# Patient Record
Sex: FEMALE | Race: BLACK OR AFRICAN AMERICAN | Employment: FULL TIME | URBAN - METROPOLITAN AREA
[De-identification: names, ages, dates, MRNs, and addresses within clinical notes are randomized per-mention and may not be internally consistent; named-entity substitution may affect disease eponyms.]

---

## 2017-01-04 ENCOUNTER — DOCUMENTATION ONLY (OUTPATIENT)
Dept: INTERNAL MEDICINE CLINIC | Age: 47
End: 2017-01-04

## 2017-01-04 ENCOUNTER — CLINICAL SUPPORT (OUTPATIENT)
Dept: INTERNAL MEDICINE CLINIC | Age: 47
End: 2017-01-04

## 2017-01-04 DIAGNOSIS — Z00.00 HEALTHCARE MAINTENANCE: Primary | ICD-10-CM

## 2017-01-04 NOTE — PROGRESS NOTES
Patient received Tdap in office today. Administered Boostrix 0.5 ml in left deltoid, IM.  Pt tolerated well

## 2017-01-05 ENCOUNTER — OFFICE VISIT (OUTPATIENT)
Dept: INTERNAL MEDICINE CLINIC | Age: 47
End: 2017-01-05

## 2017-01-05 VITALS
OXYGEN SATURATION: 97 % | WEIGHT: 241 LBS | RESPIRATION RATE: 16 BRPM | HEIGHT: 63 IN | HEART RATE: 89 BPM | DIASTOLIC BLOOD PRESSURE: 80 MMHG | SYSTOLIC BLOOD PRESSURE: 114 MMHG | TEMPERATURE: 97.7 F | BODY MASS INDEX: 42.7 KG/M2

## 2017-01-05 DIAGNOSIS — M94.262 CHONDROMALACIA OF LEFT KNEE: ICD-10-CM

## 2017-01-05 DIAGNOSIS — J30.89 NON-SEASONAL ALLERGIC RHINITIS DUE TO OTHER ALLERGIC TRIGGER: ICD-10-CM

## 2017-01-05 DIAGNOSIS — Z00.00 WELL WOMAN EXAM (NO GYNECOLOGICAL EXAM): Primary | ICD-10-CM

## 2017-01-05 DIAGNOSIS — N92.4 EXCESSIVE BLEEDING IN PREMENOPAUSAL PERIOD: ICD-10-CM

## 2017-01-05 DIAGNOSIS — F41.8 DEPRESSION WITH ANXIETY: ICD-10-CM

## 2017-01-05 DIAGNOSIS — R73.09 ELEVATED HEMOGLOBIN A1C: ICD-10-CM

## 2017-01-05 DIAGNOSIS — H92.01 EAR PAIN, RIGHT: ICD-10-CM

## 2017-01-05 RX ORDER — DULOXETIN HYDROCHLORIDE 60 MG/1
60 CAPSULE, DELAYED RELEASE ORAL
Qty: 60 CAP | Refills: 1 | Status: SHIPPED | OUTPATIENT
Start: 2017-01-05 | End: 2017-02-20 | Stop reason: SDUPTHER

## 2017-01-05 NOTE — PROGRESS NOTES
HISTORY OF PRESENT ILLNESS  Andrea Farris is a 42 y.o. female. HPI   Health Maintenance   Topic Date Due    PAP AKA CERVICAL CYTOLOGY  08/11/2018    DTaP/Tdap/Td series (2 - Td) 01/04/2027    INFLUENZA AGE 9 TO ADULT  Addressed   Body mass index is 43.08 kg/(m^2). has gained weight   She has not been able to exercise due to knee pain (wa biking after knee injury) and dietary indiscretion. Depression  Patient is seen for followup of depression. She has not been treated for depression in past.Suspected mother had mood disorder. She has been to therapy in the past. She did not connect with the therapist.   +Anger & PMDD   Stressors: New NP Luis Antonio Serna, looking for a job. she denies suicidal thoughts with specific plan. she experiences the following side effects from the treatment: none. PHQ 2 / 9, over the last two weeks 1/5/2017   Little interest or pleasure in doing things Nearly every day   Feeling down, depressed or hopeless Nearly every day   Total Score PHQ 2 6   Trouble falling or staying asleep, or sleeping too much Nearly every day   Feeling tired or having little energy Nearly every day   Poor appetite or overeating Nearly every day   Feeling bad about yourself - or that you are a failure or have let yourself or your family down Several days   Trouble concentrating on things such as school, work, reading or watching TV Not at all   Moving or speaking so slowly that other people could have noticed; or the opposite being so fidgety that others notice Not at all   Thoughts of being better off dead, or hurting yourself in some way Not at all   PHQ 9 Score 16   How difficult have these problems made it for you to do your work, take care of your home and get along with others Very difficult     Birth Control  She has h/o PMDD and fibroids. She has stopped OCP due to CVA concerns. She has a gynecologist.     Knee Pain  Ongoing. Scheduled for Right knee surgery (meniscectomy) 1/18/2017.      Ear Pain  Right ear pain started yesterday. Denies URI sx, fevers, chills. Review of Systems   Constitutional: Negative for chills, fever and weight loss. HENT: Negative for congestion and sore throat. Eyes: Negative for blurred vision and double vision. Respiratory: Negative for cough and shortness of breath. Cardiovascular: Negative for chest pain, orthopnea and leg swelling. Gastrointestinal: Negative for abdominal pain, blood in stool, constipation, diarrhea, heartburn, nausea and vomiting. Genitourinary: Negative for frequency and urgency. Musculoskeletal: Negative for joint pain and myalgias. Neurological: Negative for dizziness, tremors, weakness and headaches. Endo/Heme/Allergies: Does not bruise/bleed easily. Psychiatric/Behavioral: Positive for depression. Negative for suicidal ideas. The patient is nervous/anxious (AIDEN 7 score 8). Patient Active Problem List    Diagnosis Date Noted    Chondromalacia of left knee 12/22/2015    Migraines 07/01/2015    Menorrhagia 07/01/2015    Pulmonary sarcoidosis (HCC) 07/01/2015       Current Outpatient Prescriptions   Medication Sig Dispense Refill    DULoxetine (CYMBALTA) 60 mg capsule Take 1 Cap by mouth daily (with breakfast). 60 Cap 1    traMADol (ULTRAM) 50 mg tablet Take 1 Tab by mouth every eight (8) hours as needed for Pain. Max Daily Amount: 150 mg. 45 Tab 0    azelastine (ASTELIN) 137 mcg (0.1 %) nasal spray 1 [place] by Both Nostrils route two (2) times a day. Use in each nostril as directed 1 Bottle 3    levonorgestrel-ethinyl estradiol (ALTAVERA, 28,) 0.15-0.03 mg per tablet Take  by mouth daily.          Allergies   Allergen Reactions    Bactrim [Sulfamethoprim Ds] Hives and Other (comments)     flushing      Visit Vitals    /80 (BP 1 Location: Right arm, BP Patient Position: Sitting)    Pulse 89    Temp 97.7 °F (36.5 °C) (Oral)    Resp 16    Ht 5' 2.72\" (1.593 m)    Wt 241 lb (109.3 kg)    SpO2 97%    BMI 43.08 kg/m2       Physical Exam   Constitutional: She is oriented to person, place, and time. She appears well-developed and well-nourished. HENT:   Right Ear: External ear normal.   Left Ear: External ear normal.   Mouth/Throat: Oropharynx is clear and moist. No oropharyngeal exudate. Eyes: Conjunctivae are normal. No scleral icterus. Neck: Normal range of motion. Neck supple. No thyromegaly present. Cardiovascular: Normal rate, regular rhythm and normal heart sounds. Exam reveals no gallop and no friction rub. No murmur heard. Pulmonary/Chest: Effort normal and breath sounds normal. No respiratory distress. She has no wheezes. She has no rales. She exhibits no tenderness. Abdominal: Soft. Bowel sounds are normal. She exhibits no distension. There is no tenderness. There is no rebound and no guarding. Genitourinary: Rectal exam shows guaiac negative stool. Genitourinary Comments: Deferred for gyn per pt request   Musculoskeletal: Normal range of motion. She exhibits no edema or tenderness. Neurological: She is alert and oriented to person, place, and time. Psychiatric:   Tearful       ASSESSMENT and PLAN  Ashly Fischer was seen today for complete physical.    Diagnoses and all orders for this visit:    Well woman exam (no gynecological exam)- Health Maintenance reviewed   -     CBC WITH AUTOMATED DIFF  -     LIPID PANEL  -     METABOLIC PANEL, COMPREHENSIVE  -     HEMOGLOBIN A1C WITH EAG  -     TSH 3RD GENERATION  -     THYROID PANEL  -     VITAMIN D, 25 HYDROXY    Elevated hemoglobin A1c- check A1c     Excessive bleeding in premenopausal period- see Gynecology    Chondromalacia of left knee- per Orthopedics      Depression with anxiety- Will start Cymbalta based on her chronic pain and neutral weight gain profile. Patient Education:  Reviewed concept of depression as biochemical imbalance of neurotransmitters and rationale for treatment.  Instructed patient to contact office or 911 promptly should condition worsen or any new symptoms appear and provided on-call telephone numbers. IF THE PATIENT HAS ANY SUICIDAL OR HOMICIDAL IDEATION, CALL THE OFFICE, DISCUSS WITH A SUPPORT MEMBER OR GO TO THE ER IMMEDIATELY. Patient was agreeable with this    -     DULoxetine (CYMBALTA) 60 mg capsule; Take 1 Cap by mouth daily (with breakfast). -     REFERRAL TO PSYCHOLOGY    Ear pain, right- due to eustachian dysfunction. Resume astelin and nasal saline    Non-seasonal allergic rhinitis due to other allergic trigger-       Follow-up Disposition:  Return in about 6 weeks (around 2/16/2017) for Follow-up Depression. Medication risks/benefits/costs/interactions/alternatives discussed with patient. Mckenzie Bradford  was given an after visit summary which includes diagnoses, current medications, & vitals. she expressed understanding with the diagnosis and plan.

## 2017-01-05 NOTE — PATIENT INSTRUCTIONS
It was a pleasure to see you! As discussed:    Health Maintenance   I have ordered your age appropriate labs please complete them. You will need to fast 10-12hrs before your appointment. Start paying more attention to your diet. Goal for exercise is 150minutes of moderate exercise a day and diet goal is to eat 50% fruits and vegetables with minimal sugar, fat and oil daily. See health.gov or choosemyplate.gov for more details. Your cervical cancer and breast cancer screening will be completed by your ob/ gyn as scheduled. Anxiety and Depression   I have prescribed Cymbalta. It may make your symptoms worse int the first 2 weeks before improving your symptoms. .    Try eating \"mood foods\"  (Recommendation: reduce carbs to 150-200g/ day and the Mediterranean Diet). Exercise at least 5 mins a day   See below for more information    We can increase the medication as needed for your symptoms. Please allow 2-4 weeks to see a difference. I have also ordered labs to check for medical causes. If you have any thoughts of harming yourself or others please seek immediate medical attention. If you have non urgent concerns, feel free to contact the office                  When should you call for help? Call 911 anytime you think you may need emergency care. For example, call if:  · You feel you cannot stop from hurting yourself or someone else. Call your doctor now or seek immediate medical care if:  · You hear voices. · You feel much more depressed. Try finding a therapist using the list provided and www. psychologySocialDiabetes.[employer]. [facility]   Address: [facility] [address], [place], [address]  [phone]    [facility]  [address]  Sergio Young  [phone]       Recovering From Depression: Care Instructions  Your Care Instructions  Taking good care of yourself is important as you recover from depression.  In time, your symptoms will fade as your treatment takes hold. Do not give up. Instead, focus your energy on getting better. Your mood will improve. It just takes some time. Focus on things that can help you feel better, such as being with friends and family, eating well, and getting enough rest. But take things slowly. Do not do too much too soon. You will begin to feel better gradually. Follow-up care is a key part of your treatment and safety. Be sure to make and go to all appointments, and call your doctor if you are having problems. It's also a good idea to know your test results and keep a list of the medicines you take. How can you care for yourself at home? Be realistic  · If you have a large task to do, break it up into smaller steps you can handle, and just do what you can. · You may want to put off important decisions until your depression has lifted. If you have plans that will have a major impact on your life, such as marriage, divorce, or a job change, try to wait a bit. Talk it over with friends and loved ones who can help you look at the overall picture first.  · Reaching out to people for help is important. Do not isolate yourself. Let your family and friends help you. Find someone you can trust and confide in, and talk to that person. · Be patient, and be kind to yourself. Remember that depression is not your fault and is not something you can overcome with willpower alone. Treatment is necessary for depression, just like for any other illness. Feeling better takes time, and your mood will improve little by little. Stay active  · Stay busy and get outside. Take a walk, or try some other light exercise. · Talk with your doctor about an exercise program. Exercise can help with mild depression. · Go to a movie or concert. Take part in a Quaker activity or other social gathering. Go to a ball game. · Ask a friend to have dinner with you.   Take care of yourself  · Eat a balanced diet with plenty of fresh fruits and vegetables, whole grains, and lean protein. If you have lost your appetite, eat small snacks rather than large meals. · Avoid drinking alcohol or using illegal drugs. Do not take medicines that have not been prescribed for you. They may interfere with medicines you may be taking for depression, or they may make your depression worse. · Take your medicines exactly as they are prescribed. You may start to feel better within 1 to 3 weeks of taking antidepressant medicine. But it can take as many as 6 to 8 weeks to see more improvement. If you have questions or concerns about your medicines, or if you do not notice any improvement by 3 weeks, talk to your doctor. · If you have any side effects from your medicine, tell your doctor. Antidepressants can make you feel tired, dizzy, or nervous. Some people have dry mouth, constipation, headaches, sexual problems, or diarrhea. Many of these side effects are mild and will go away on their own after you have been taking the medicine for a few weeks. Some may last longer. Talk to your doctor if side effects are bothering you too much. You might be able to try a different medicine. · Get enough sleep. If you have problems sleeping:  ¨ Go to bed at the same time every night, and get up at the same time every morning. ¨ Keep your bedroom dark and quiet. ¨ Do not exercise after 5:00 p.m. ¨ Avoid drinks with caffeine after 5:00 p.m. · Avoid sleeping pills unless they are prescribed by the doctor treating your depression. Sleeping pills may make you groggy during the day, and they may interact with other medicine you are taking. · If you have any other illnesses, such as diabetes, heart disease, or high blood pressure, make sure to continue with your treatment. Tell your doctor about all of the medicines you take, including those with or without a prescription. · Keep the numbers for these national suicide hotlines: 4-025-772-TALK (7-292.231.4258) and 0-677-PTPAPVD (7-884.307.2561).  If you or someone you know talks about suicide or feeling hopeless, get help right away. When should you call for help? Call 911 anytime you think you may need emergency care. For example, call if:  · You feel like hurting yourself or someone else. · Someone you know has depression and is about to attempt or is attempting suicide. Call your doctor now or seek immediate medical care if:  · You hear voices. · Someone you know has depression and:  ¨ Starts to give away his or her possessions. ¨ Uses illegal drugs or drinks alcohol heavily. ¨ Talks or writes about death, including writing suicide notes or talking about guns, knives, or pills. ¨ Starts to spend a lot of time alone. ¨ Acts very aggressively or suddenly appears calm. Watch closely for changes in your health, and be sure to contact your doctor if:  · You do not get better as expected. Where can you learn more? Go to http://slim-leesa.info/. Enter Q073 in the search box to learn more about \"Recovering From Depression: Care Instructions. \"  Current as of: July 26, 2016  Content Version: 11.1  © 8316-8136 [employer]. Care instructions adapted under license by [employer] (which disclaims liability or warranty for this information). If you have questions about a medical condition or this instruction, always ask your healthcare professional. [address] any warranty or liability for your use of this information.

## 2017-02-20 ENCOUNTER — OFFICE VISIT (OUTPATIENT)
Dept: INTERNAL MEDICINE CLINIC | Age: 47
End: 2017-02-20

## 2017-02-20 VITALS
HEART RATE: 104 BPM | BODY MASS INDEX: 42.77 KG/M2 | SYSTOLIC BLOOD PRESSURE: 114 MMHG | DIASTOLIC BLOOD PRESSURE: 66 MMHG | HEIGHT: 63 IN | RESPIRATION RATE: 18 BRPM | OXYGEN SATURATION: 97 % | TEMPERATURE: 97.9 F | WEIGHT: 241.4 LBS

## 2017-02-20 DIAGNOSIS — F41.8 DEPRESSION WITH ANXIETY: ICD-10-CM

## 2017-02-20 DIAGNOSIS — H92.01 EAR PAIN, RIGHT: ICD-10-CM

## 2017-02-20 DIAGNOSIS — R09.3: ICD-10-CM

## 2017-02-20 DIAGNOSIS — R10.9 FLANK PAIN: ICD-10-CM

## 2017-02-20 DIAGNOSIS — J30.89 NON-SEASONAL ALLERGIC RHINITIS DUE TO OTHER ALLERGIC TRIGGER: ICD-10-CM

## 2017-02-20 DIAGNOSIS — R30.0 BURNING WITH URINATION: Primary | ICD-10-CM

## 2017-02-20 DIAGNOSIS — H65.23 BILATERAL CHRONIC SEROUS OTITIS MEDIA: ICD-10-CM

## 2017-02-20 DIAGNOSIS — R35.0 FREQUENT URINATION: ICD-10-CM

## 2017-02-20 LAB
BILIRUB UR QL STRIP: NEGATIVE
GLUCOSE UR-MCNC: NEGATIVE MG/DL
HCG QL BLOOD POCT, HCGQLPOCT: NORMAL
HCG URINE, QL. (POC): NEGATIVE
KETONES P FAST UR STRIP-MCNC: NEGATIVE MG/DL
PH UR STRIP: 6.5 [PH] (ref 4.6–8)
PROT UR QL STRIP: NEGATIVE MG/DL
SP GR UR STRIP: 1.03 (ref 1–1.03)
UA UROBILINOGEN AMB POC: NORMAL (ref 0.2–1)
URINALYSIS CLARITY POC: CLEAR
URINALYSIS COLOR POC: YELLOW
URINE BLOOD POC: NEGATIVE
URINE LEUKOCYTES POC: NEGATIVE
URINE NITRITES POC: NEGATIVE
VALID INTERNAL CONTROL?: YES

## 2017-02-20 RX ORDER — DULOXETIN HYDROCHLORIDE 60 MG/1
120 CAPSULE, DELAYED RELEASE ORAL
Qty: 60 CAP | Refills: 1 | Status: SHIPPED | OUTPATIENT
Start: 2017-02-20 | End: 2022-07-14

## 2017-02-20 RX ORDER — CEFDINIR 300 MG/1
300 CAPSULE ORAL 2 TIMES DAILY
Qty: 6 CAP | Refills: 0 | Status: SHIPPED | OUTPATIENT
Start: 2017-02-20 | End: 2017-02-23

## 2017-02-20 RX ORDER — FLUTICASONE PROPIONATE 50 MCG
2 SPRAY, SUSPENSION (ML) NASAL DAILY
Qty: 1 BOTTLE | Refills: 1 | Status: SHIPPED | OUTPATIENT
Start: 2017-02-20 | End: 2022-07-14

## 2017-02-20 NOTE — PATIENT INSTRUCTIONS
It was a pleasure to see you! As discussed: You have a  UTI symptoms  Take the antibiotics as prescribed-I will notify you of the lab results   I've sent  A culture of the urine to find out what bacteria is causing your symptoms and if the current antibiotics will be effective. If we need to change your medication,our office will call you. Allergic Rhinitis causing ear pain  -Use Flonase 1-2 sprays per nostril once a day  -Use nasal saline spray 3-4 times/day BEFORE Flonase  -Start taking a non sedating antihistamine such as Claritin, Allegra or Zyrtec (generic is fine)      Stay well hydrated. Drink sports drinks- Gatorade, Powerade, or similar drink at least two/ day. For every glass of water you drink have 2-3 crackers or a meal. Salt is essential to keeping fluid in your body. The sign that you drank enough is for your urine to be very light in color and not feeling dizziness       Oral Rehydration: Care Instructions  Your Care Instructions  Dehydration occurs when your body loses too much water. This can happen if you do not drink enough fluids or lose a lot of fluid due to diarrhea, vomiting, or sweating. Being dehydrated can cause health problems and can even be life-threatening. To replace lost fluids, you need to drink liquid that contains special chemicals called electrolytes. Electrolytes keep your body working well. Plain water does not have electrolytes. You also need to rest to prevent more fluid loss. Replacing water and electrolytes (oral rehydration) completely takes about 36 hours. But you should feel better within a few hours. Follow-up care is a key part of your treatment and safety. Be sure to make and go to all appointments, and call your doctor if you are having problems. It's also a good idea to know your test results and keep a list of the medicines you take. How can you care for yourself at home?   · Take frequent sips of a drink such as Gatorade, Powerade, or other rehydration drinks that your doctor suggests. These replace both fluid and important chemicals (electrolytes) you need for balance in your blood. · Drink 2 quarts of cool liquid over 2 to 4 hours. You should have at least 10 glasses of liquid a day to replace lost fluid. If you have kidney, heart, or liver disease and have to limit fluids, talk with your doctor before you increase the amount of fluids you drink. · Make your own drink. Measure everything carefully. The drink may not work well or may even be harmful if the amounts are off. ¨ 1 quart water  ¨ ½ teaspoon salt  ¨ 6 teaspoons sugar  · Do not drink liquid with caffeine, such as coffee and mahendra. · Do not drink any alcohol. It can make you dehydrated. · Drink plenty of fluids, enough so that your urine is light yellow or clear like water. If you have kidney, heart, or liver disease and have to limit fluids, talk with your doctor before you increase the amount of fluids you drink. When should you call for help? Call 911 anytime you think you may need emergency care. For example, call if:  · You have signs of severe dehydration, such as:  ¨ You are confused or unable to stay awake. ¨ You passed out (lost consciousness). Call your doctor now or seek immediate medical care if:  · You still have signs of dehydration. You have sunken eyes and a dry mouth, and you pass only a little dark urine. · You are dizzy or lightheaded, or you feel like you may faint. · You are not able to keep down fluids. Watch closely for changes in your health, and be sure to contact your doctor if:  · You do not get better as expected. Where can you learn more? Go to http://slim-leesa.info/. Enter I040 in the search box to learn more about \"Oral Rehydration: Care Instructions. \"  Current as of: May 27, 2016  Content Version: 11.1  © 6839-6340 [employer].  Care instructions adapted under license by [employer] (which disclaims liability or warranty for this information). If you have questions about a medical condition or this instruction, always ask your healthcare professional. [place] [address] any warranty or liability for your use of this information.

## 2017-02-20 NOTE — PROGRESS NOTES
HISTORY OF PRESENT ILLNESS  Don Seals is a 42 y.o. female. Bladder Infection    This is a recurrent problem. The problem has been gradually worsening. The quality of the pain is described as burning. Associated symptoms include frequency and flank pain (left x 1 week). Pertinent negatives include no chills, no nausea, no vomiting and no abdominal pain. She has tried increased fluids for the symptoms. The treatment provided mild relief. Her past medical history does not include recurrent UTIs. Depression  Patient is seen for followup of depression. Treatment includes Cymbalta and no other therapies. She reports her anger has returned   she denies suicidal thoughts with specific plan. she experiences the following side effects from the treatment: none. PHQ 2 / 9, over the last two weeks 2/20/2017   Little interest or pleasure in doing things Not at all   Feeling down, depressed or hopeless Not at all   Total Score PHQ 2 0   Trouble falling or staying asleep, or sleeping too much -   Feeling tired or having little energy -   Poor appetite or overeating -   Feeling bad about yourself - or that you are a failure or have let yourself or your family down -   Trouble concentrating on things such as school, work, reading or watching TV -   Moving or speaking so slowly that other people could have noticed; or the opposite being so fidgety that others notice -   Thoughts of being better off dead, or hurting yourself in some way -   PHQ 9 Score -   How difficult have these problems made it for you to do your work, take care of your home and get along with others -           SHx: NP at [facility] in [facility] - Female psych    Review of Systems   Constitutional: Negative for chills. LMP 1/20/17   HENT: Ear pain: right ear pain x 4 days     Gastrointestinal: Negative for abdominal pain, nausea and vomiting. Genitourinary: Positive for dysuria, flank pain (left x 1 week) and frequency. Incomplete    Skin: Negative for rash. Patient Active Problem List    Diagnosis Date Noted    Chondromalacia of left knee 12/22/2015    Migraines 07/01/2015    Menorrhagia 07/01/2015    Pulmonary sarcoidosis (HCC) 07/01/2015       Current Outpatient Prescriptions   Medication Sig Dispense Refill    DULoxetine (CYMBALTA) 60 mg capsule Take 1 Cap by mouth daily (with breakfast). 60 Cap 1    traMADol (ULTRAM) 50 mg tablet Take 1 Tab by mouth every eight (8) hours as needed for Pain. Max Daily Amount: 150 mg. 45 Tab 0    azelastine (ASTELIN) 137 mcg (0.1 %) nasal spray 1 [place] by Both Nostrils route two (2) times a day. Use in each nostril as directed 1 Bottle 3    levonorgestrel-ethinyl estradiol (ALTAVERA, 28,) 0.15-0.03 mg per tablet Take  by mouth daily. Allergies   Allergen Reactions    Bactrim [Sulfamethoprim Ds] Hives and Other (comments)     flushing      Visit Vitals    /66 (BP 1 Location: Right arm, BP Patient Position: Sitting)    Pulse (!) 104    Temp 97.9 °F (36.6 °C) (Oral)    Resp 18    Ht 5' 2.72\" (1.593 m)    Wt 241 lb 6.4 oz (109.5 kg)    SpO2 97%    BMI 43.14 kg/m2      Physical Exam   Constitutional: She is oriented to person, place, and time. She appears well-developed and well-nourished. No distress. HENT:   Right Ear: Tympanic membrane is injected and erythematous. Left Ear: Tympanic membrane is injected and erythematous. Nose: Rhinorrhea and sinus tenderness present. Mouth/Throat: Oropharynx is clear and moist. No oropharyngeal exudate. Eyes: Conjunctivae are normal. No scleral icterus. Neck: No thyromegaly present. Cardiovascular: Normal rate, regular rhythm and normal heart sounds. Pulmonary/Chest: Effort normal and breath sounds normal.   Abdominal: Soft. Bowel sounds are normal. She exhibits no distension and no mass. There is tenderness in the suprapubic area. There is no rebound, no guarding and no CVA tenderness.    Musculoskeletal: She exhibits no edema. Neurological: She is alert and oriented to person, place, and time. Skin: Skin is warm and dry. No rash noted. Psychiatric: She has a normal mood and affect. Recent Results (from the past 12 hour(s))   AMB POC URINALYSIS DIP STICK AUTO W/O MICRO    Collection Time: 02/20/17 11:45 AM   Result Value Ref Range    Color (UA POC) Yellow     Clarity (UA POC) Clear     Glucose (UA POC) Negative Negative    Bilirubin (UA POC) Negative Negative    Ketones (UA POC) Negative Negative    Specific gravity (UA POC) 1.030 1.001 - 1.035    Blood (UA POC) Negative Negative    pH (UA POC) 6.5 4.6 - 8.0    Protein (UA POC) Negative Negative mg/dL    Urobilinogen (UA POC) 0.2 mg/dL 0.2 - 1    Nitrites (UA POC) Negative Negative    Leukocyte esterase (UA POC) Negative Negative   AMB POC GONADOTROPIN, CHORIONIC (HCG); QUALITATIVE    Collection Time: 02/20/17 12:05 PM   Result Value Ref Range    VALID INTERNAL CONTROL POC Yes     HCG urine, Ql. (POC) Negative Negative    HCG blood, Ql. (POC)           ASSESSMENT and PLAN  Valeria Figueroa was seen today for symptoms of bladder infection with normal UA. 3 days of abx called     Diagnoses and all orders for this visit:    Burning with urination  -     AMB POC URINALYSIS DIP STICK AUTO W/O MICRO  -     AMB POC GONADOTROPIN, CHORIONIC (HCG); QUALITATIVE  -     CULTURE, URINE  -     URINALYSIS W/ RFLX MICROSCOPIC  -     cefdinir (OMNICEF) 300 mg capsule; Take 1 Cap by mouth two (2) times a day for 3 days. Frequent urination  -     AMB POC URINALYSIS DIP STICK AUTO W/O MICRO  -     AMB POC GONADOTROPIN, CHORIONIC (HCG); QUALITATIVE  -     CULTURE, URINE  -     URINALYSIS W/ RFLX MICROSCOPIC    Flank pain  -     AMB POC URINALYSIS DIP STICK AUTO W/O MICRO  -     AMB POC GONADOTROPIN, CHORIONIC (HCG); QUALITATIVE  -     CULTURE, URINE  -     URINALYSIS W/ RFLX MICROSCOPIC  -     cefdinir (OMNICEF) 300 mg capsule; Take 1 Cap by mouth two (2) times a day for 3 days.     \\  Julia Westbrook pain, right    Bilateral chronic serous otitis media    Non-seasonal allergic rhinitis due to other allergic trigger    Depression with anxiety- not well controlled increase to 120mg may not be effective. Will change medications as needed. Patient Education:  Reviewed concept of depression as biochemical imbalance of neurotransmitters and rationale for treatment. Instructed patient to contact office or 911 promptly should condition worsen or any new symptoms appear and provided on-call telephone numbers. IF THE PATIENT HAS ANY SUICIDAL OR HOMICIDAL IDEATION, CALL THE OFFICE, DISCUSS WITH A SUPPORT MEMBER OR GO TO THE ER IMMEDIATELY. Patient was agreeable with this    -     DULoxetine (CYMBALTA) 60 mg capsule; Take 2 Caps by mouth daily (with breakfast). Other orders  -     fluticasone (FLONASE) 50 mcg/actuation nasal spray; 2 Sprays by Both Nostrils route daily. Follow-up Disposition:  Return in about 6 weeks (around 4/3/2017) for Follow-up Depression. Medication risks/benefits/costs/interactions/alternatives discussed with patient. Milagros Brower  was given an after visit summary which includes diagnoses, current medications, & vitals. she expressed understanding with the diagnosis and plan.

## 2017-02-20 NOTE — MR AVS SNAPSHOT
Visit Information Date & Time Provider Department Dept. Phone Encounter #  
 2/20/2017 11:30 AM Eliza Schaumann, MD [address] [address] Internal Medicine [phone] [id number] Follow-up Instructions Return in about 6 weeks (around 4/3/2017) for Follow-up Depression. Upcoming Health Maintenance Date Due  
 PAP AKA CERVICAL CYTOLOGY 8/11/2018 DTaP/Tdap/Td series (2 - Td) 1/4/2027 Allergies as of 2/20/2017  Review Complete On: 2/20/2017 By: Eliza Schaumann, MD  
  
 Severity Noted Reaction Type Reactions Bactrim [Sulfamethoprim Ds]  03/30/2016    Hives, Other (comments)  
 flushing Current Immunizations  Reviewed on 3/17/2016 Name Date Influenza Vaccine 10/15/2015 TB Skin Test (PPD) 2/26/2016 Tdap 1/4/2017 Not reviewed this visit You Were Diagnosed With   
  
 Codes Comments Burning with urination    -  Primary ICD-10-CM: R30.0 ICD-9-CM: 788.1 Frequent urination     ICD-10-CM: R35.0 ICD-9-CM: 788.41 Flank pain     ICD-10-CM: R10.9 ICD-9-CM: 789.09 Abnormal odor of sputum     ICD-10-CM: R09.3 ICD-9-CM: 934. 4 Ear pain, right     ICD-10-CM: H92.01 
ICD-9-CM: 388.70 Bilateral chronic serous otitis media     ICD-10-CM: M67.06 ICD-9-CM: 381.10 Non-seasonal allergic rhinitis due to other allergic trigger     ICD-10-CM: J30.89 Depression with anxiety     ICD-10-CM: F41.8 ICD-9-CM: 300.4 Vitals BP Pulse Temp Resp Height(growth percentile) Weight(growth percentile) 114/66 (BP 1 Location: Right arm, BP Patient Position: Sitting) (!) 104 97.9 °F (36.6 °C) (Oral) 18 5' 2.72\" (1.593 m) 241 lb 6.4 oz (109.5 kg) LMP SpO2 BMI OB Status Smoking Status 01/18/2017 97% 43.14 kg/m2 Having regular periods Never Smoker Vitals History BMI and BSA Data Body Mass Index Body Surface Area  
 43.14 kg/m 2 2.2 m 2 Preferred Pharmacy Pharmacy Name Phone [facility] [address] [address] Your Updated Medication List  
  
   
This list is accurate as of: 17 12:15 PM.  Always use your most recent med list. ALTAVERA (28) 0.15-0.03 mg Tab Generic drug:  levonorgestrel-ethinyl estradiol Take  by mouth daily. azelastine 137 mcg (0.1 %) nasal spray Commonly known as:  ASTELIN  
1 Spray by Both Nostrils route two (2) times a day. Use in each nostril as directed  
  
 cefdinir 300 mg capsule Commonly known as:  OMNICEF Take 1 Cap by mouth two (2) times a day for 3 days. DULoxetine 60 mg capsule Commonly known as:  CYMBALTA Take 2 Caps by mouth daily (with breakfast). fluticasone 50 mcg/actuation nasal spray Commonly known as:  Euna Mario 2 Sprays by Both Nostrils route daily. traMADol 50 mg tablet Commonly known as:  ULTRAM  
Take 1 Tab by mouth every eight (8) hours as needed for Pain. Max Daily Amount: 150 mg.  
  
  
  
  
Prescriptions Sent to Pharmacy Refills  
 cefdinir (OMNICEF) 300 mg capsule 0 Sig: Take 1 Cap by mouth two (2) times a day for 3 days. Class: Normal  
 Pharmacy: [facility] [address] [place] [address] Ph #: [phone] Route: Oral  
 fluticasone (FLONASE) 50 mcg/actuation nasal spray 1 Si Sprays by Both Nostrils route daily. Class: Normal  
 Pharmacy: [facility] [place] [address] [place] [address] Ph #: [phone] Route: Both Nostrils DULoxetine (CYMBALTA) 60 mg capsule 1 Sig: Take 2 Caps by mouth daily (with breakfast). Class: Normal  
 Pharmacy: [address] [place] [address] Ph #: [phone] Route: Oral  
  
We Performed the Following AMB POC GONADOTROPIN, CHORIONIC (HCG); QUALITATIVE [31627 CPT(R)] AMB POC URINALYSIS DIP STICK AUTO W/O MICRO [58958 CPT(R)] CULTURE, URINE [id number] CPT(R)] URINALYSIS W/ RFLX MICROSCOPIC [44593 CPT(R)] Follow-up Instructions Return in about 6 weeks (around 4/3/2017) for Follow-up Depression. Patient Instructions It was a pleasure to see you! As discussed: You have a  UTI symptoms Take the antibiotics as prescribed-I will notify you of the lab results I've sent  A culture of the urine to find out what bacteria is causing your symptoms and if the current antibiotics will be effective. If we need to change your medication,our office will call you. Allergic Rhinitis causing ear pain 
-Use Flonase 1-2 sprays per nostril once a day 
-Use nasal saline spray 3-4 times/day BEFORE Flonase 
-Start taking a non sedating antihistamine such as Claritin, Allegra or Zyrtec (generic is fine) Stay well hydrated. Drink sports drinks- Gatorade, Powerade, or similar drink at least two/ day. For every glass of water you drink have 2-3 crackers or a meal. Salt is essential to keeping fluid in your body. The sign that you drank enough is for your urine to be very light in color and not feeling dizziness Oral Rehydration: Care Instructions Your Care Instructions Dehydration occurs when your body loses too much water. This can happen if you do not drink enough fluids or lose a lot of fluid due to diarrhea, vomiting, or sweating. Being dehydrated can cause health problems and can even be life-threatening. To replace lost fluids, you need to drink liquid that contains special chemicals called electrolytes. Electrolytes keep your body working well. Plain water does not have electrolytes. You also need to rest to prevent more fluid loss. Replacing water and electrolytes (oral rehydration) completely takes about 36 hours. But you should feel better within a few hours. Follow-up care is a key part of your treatment and safety.  Be sure to make and go to all appointments, and call your doctor if you are having problems. It's also a good idea to know your test results and keep a list of the medicines you take. How can you care for yourself at home? · Take frequent sips of a drink such as Gatorade, Powerade, or other rehydration drinks that your doctor suggests. These replace both fluid and important chemicals (electrolytes) you need for balance in your blood. · Drink 2 quarts of cool liquid over 2 to 4 hours. You should have at least 10 glasses of liquid a day to replace lost fluid. If you have kidney, heart, or liver disease and have to limit fluids, talk with your doctor before you increase the amount of fluids you drink. · Make your own drink. Measure everything carefully. The drink may not work well or may even be harmful if the amounts are off. ¨ 1 quart water ¨ ½ teaspoon salt ¨ 6 teaspoons sugar · Do not drink liquid with caffeine, such as coffee and mahendra. · Do not drink any alcohol. It can make you dehydrated. · Drink plenty of fluids, enough so that your urine is light yellow or clear like water. If you have kidney, heart, or liver disease and have to limit fluids, talk with your doctor before you increase the amount of fluids you drink. When should you call for help? Call 911 anytime you think you may need emergency care. For example, call if: 
· You have signs of severe dehydration, such as: 
¨ You are confused or unable to stay awake. ¨ You passed out (lost consciousness). Call your doctor now or seek immediate medical care if: 
· You still have signs of dehydration. You have sunken eyes and a dry mouth, and you pass only a little dark urine. · You are dizzy or lightheaded, or you feel like you may faint. · You are not able to keep down fluids. Watch closely for changes in your health, and be sure to contact your doctor if: 
· You do not get better as expected. Where can you learn more? Go to http://slim-leesa.info/. Enter I040 in the search box to learn more about \"Oral Rehydration: Care Instructions. \" Current as of: May 27, 2016 Content Version: 11.1 © 3965-7241 [employer]. Care instructions adapted under license by [employer] (which disclaims liability or warranty for this information). If you have questions about a medical condition or this instruction, always ask your healthcare professional. [address] any warranty or liability for your use of this information. Introducing [facility]! Dear Airam Ackerman: Thank you for requesting a "[employer]" account. Our records indicate that you already have an active "[employer]" account. You can access your account anytime at [email or web address] Did you know that you can access your hospital and ER discharge instructions at any time in "[employer]"? You can also review all of your test results from your hospital stay or ER visit. Additional Information If you have questions, please visit the Frequently Asked Questions section of the "[employer]" website at [email or web address] Remember, "[employer]" is NOT to be used for urgent needs. For medical emergencies, dial 911. Now available from your iPhone and Android! Please provide this summary of care documentation to your next provider. Your primary care clinician is listed as Ethel Washington. If you have any questions after today's visit, please call [phone].

## 2017-02-22 ENCOUNTER — TELEPHONE (OUTPATIENT)
Dept: INTERNAL MEDICINE CLINIC | Age: 47
End: 2017-02-22

## 2017-02-22 LAB
APPEARANCE UR: ABNORMAL
BACTERIA #/AREA URNS HPF: NORMAL /[HPF]
BACTERIA UR CULT: ABNORMAL
BILIRUB UR QL STRIP: NEGATIVE
CASTS URNS QL MICRO: NORMAL /LPF
COLOR UR: YELLOW
EPI CELLS #/AREA URNS HPF: NORMAL /HPF
GLUCOSE UR QL: NEGATIVE
HGB UR QL STRIP: NEGATIVE
KETONES UR QL STRIP: NEGATIVE
LEUKOCYTE ESTERASE UR QL STRIP: ABNORMAL
MICRO URNS: ABNORMAL
MUCOUS THREADS URNS QL MICRO: PRESENT
NITRITE UR QL STRIP: NEGATIVE
PH UR STRIP: 6 [PH] (ref 5–7.5)
PROT UR QL STRIP: NEGATIVE
RBC #/AREA URNS HPF: NORMAL /HPF
SP GR UR: 1.01 (ref 1–1.03)
UROBILINOGEN UR STRIP-MCNC: 0.2 MG/DL (ref 0.2–1)
WBC #/AREA URNS HPF: NORMAL /HPF

## 2017-02-22 NOTE — TELEPHONE ENCOUNTER
Informed UA shows UTI. Patient has not started antibiotics, waiting for results. Informed to start Abx, if symptoms does not improve after 3 days call office. Pt verbalized understanding.

## 2017-02-22 NOTE — PROGRESS NOTES
Please call [phone] and let her know her UA in the lab did show an E. Coli UTI . Are her symptoms improving with the 3 days of abx? If not please call in an additional 4 days of Omnicef 300mg po BID and Order a UA/ M with reflex to culture to be completed if sx not improving. If they are fully resolved no further treatment or testing needed.

## 2017-02-22 NOTE — TELEPHONE ENCOUNTER
----- Message from Jennett Lanes, MD sent at 2/22/2017  2:28 PM EST -----  Please call [phone] and let her know her UA in the lab did show an E. Coli UTI . Are her symptoms improving with the 3 days of abx? If not please call in an additional 4 days of Omnicef 300mg po BID and Order a UA/ M with reflex to culture to be completed if sx not improving. If they are fully resolved no further treatment or testing needed.

## 2017-06-27 RX ORDER — DICLOFENAC SODIUM 75 MG/1
TABLET, DELAYED RELEASE ORAL
Qty: 60 TAB | Refills: 0 | Status: SHIPPED | OUTPATIENT
Start: 2017-06-27 | End: 2022-07-14

## 2017-08-31 ENCOUNTER — TELEPHONE (OUTPATIENT)
Dept: INTERNAL MEDICINE CLINIC | Age: 47
End: 2017-08-31

## 2017-08-31 NOTE — TELEPHONE ENCOUNTER
Left Message for a return phone call. Received school forms. Need to confirm if can use CPE from 1/05/2017 or if recent CPE is needed appointment will need to be scheduled.

## 2017-08-31 NOTE — TELEPHONE ENCOUNTER
Pt stated CPE within a year is sufficient for school forms. Informed patient Dr out of office for next week. Pt willing to wait for return. Advise will call when complete. Pt verbalized understanding.

## 2017-09-08 ENCOUNTER — TELEPHONE (OUTPATIENT)
Dept: INTERNAL MEDICINE CLINIC | Age: 47
End: 2017-09-08

## 2017-09-11 ENCOUNTER — TELEPHONE (OUTPATIENT)
Dept: INTERNAL MEDICINE CLINIC | Age: 47
End: 2017-09-11

## 2017-09-11 NOTE — TELEPHONE ENCOUNTER
Patient informed school forms have been complete . She requests they be faxed to her secure fax [phone].

## 2017-09-21 DIAGNOSIS — Z01.84 IMMUNITY STATUS TESTING: Primary | ICD-10-CM

## 2017-09-28 LAB
25(OH)D3+25(OH)D2 SERPL-MCNC: 30.8 NG/ML (ref 30–100)
ALBUMIN SERPL-MCNC: 3.9 G/DL (ref 3.5–5.5)
ALBUMIN/GLOB SERPL: 1.6 {RATIO} (ref 1.2–2.2)
ALP SERPL-CCNC: 76 IU/L (ref 39–117)
ALT SERPL-CCNC: 15 IU/L (ref 0–32)
AST SERPL-CCNC: 22 IU/L (ref 0–40)
BASOPHILS # BLD AUTO: 0 X10E3/UL (ref 0–0.2)
BASOPHILS NFR BLD AUTO: 0 %
BILIRUB SERPL-MCNC: 0.3 MG/DL (ref 0–1.2)
BUN SERPL-MCNC: 15 MG/DL (ref 6–24)
BUN/CREAT SERPL: 18 (ref 9–23)
CALCIUM SERPL-MCNC: 9.5 MG/DL (ref 8.7–10.2)
CHLORIDE SERPL-SCNC: 103 MMOL/L (ref 96–106)
CHOLEST SERPL-MCNC: 159 MG/DL (ref 100–199)
CO2 SERPL-SCNC: 25 MMOL/L (ref 18–29)
CREAT SERPL-MCNC: 0.84 MG/DL (ref 0.57–1)
EOSINOPHIL # BLD AUTO: 0.1 X10E3/UL (ref 0–0.4)
EOSINOPHIL NFR BLD AUTO: 2 %
ERYTHROCYTE [DISTWIDTH] IN BLOOD BY AUTOMATED COUNT: 15.7 % (ref 12.3–15.4)
EST. AVERAGE GLUCOSE BLD GHB EST-MCNC: 111 MG/DL
FT4I SERPL CALC-MCNC: 1.8 (ref 1.2–4.9)
GLOBULIN SER CALC-MCNC: 2.5 G/DL (ref 1.5–4.5)
GLUCOSE SERPL-MCNC: 85 MG/DL (ref 65–99)
HBA1C MFR BLD: 5.5 % (ref 4.8–5.6)
HCT VFR BLD AUTO: 33.7 % (ref 34–46.6)
HDLC SERPL-MCNC: 56 MG/DL
HGB BLD-MCNC: 11.3 G/DL (ref 11.1–15.9)
IMM GRANULOCYTES # BLD: 0 X10E3/UL (ref 0–0.1)
IMM GRANULOCYTES NFR BLD: 0 %
LDLC SERPL CALC-MCNC: 79 MG/DL (ref 0–99)
LYMPHOCYTES # BLD AUTO: 1.7 X10E3/UL (ref 0.7–3.1)
LYMPHOCYTES NFR BLD AUTO: 25 %
MCH RBC QN AUTO: 28.3 PG (ref 26.6–33)
MCHC RBC AUTO-ENTMCNC: 33.5 G/DL (ref 31.5–35.7)
MCV RBC AUTO: 84 FL (ref 79–97)
MONOCYTES # BLD AUTO: 0.5 X10E3/UL (ref 0.1–0.9)
MONOCYTES NFR BLD AUTO: 8 %
NEUTROPHILS # BLD AUTO: 4.3 X10E3/UL (ref 1.4–7)
NEUTROPHILS NFR BLD AUTO: 65 %
PLATELET # BLD AUTO: 334 X10E3/UL (ref 150–379)
POTASSIUM SERPL-SCNC: 4.3 MMOL/L (ref 3.5–5.2)
PROT SERPL-MCNC: 6.4 G/DL (ref 6–8.5)
RBC # BLD AUTO: 4 X10E6/UL (ref 3.77–5.28)
SODIUM SERPL-SCNC: 141 MMOL/L (ref 134–144)
T3RU NFR SERPL: 23 % (ref 24–39)
T4 SERPL-MCNC: 7.8 UG/DL (ref 4.5–12)
TRIGL SERPL-MCNC: 121 MG/DL (ref 0–149)
TSH SERPL DL<=0.005 MIU/L-ACNC: 2.22 UIU/ML (ref 0.45–4.5)
VLDLC SERPL CALC-MCNC: 24 MG/DL (ref 5–40)
WBC # BLD AUTO: 6.7 X10E3/UL (ref 3.4–10.8)

## 2017-10-02 LAB
PV1 NAB TITR SER NT: NORMAL {TITER}
PV3 NAB TITR SER NT: NORMAL {TITER}

## 2017-10-09 ENCOUNTER — TELEPHONE (OUTPATIENT)
Dept: INTERNAL MEDICINE CLINIC | Age: 47
End: 2017-10-09

## 2019-02-04 ENCOUNTER — OFFICE VISIT (OUTPATIENT)
Dept: INTERNAL MEDICINE CLINIC | Age: 49
End: 2019-02-04

## 2019-02-04 VITALS
DIASTOLIC BLOOD PRESSURE: 74 MMHG | BODY MASS INDEX: 44.12 KG/M2 | TEMPERATURE: 97.9 F | HEART RATE: 76 BPM | HEIGHT: 63 IN | WEIGHT: 249 LBS | SYSTOLIC BLOOD PRESSURE: 110 MMHG | OXYGEN SATURATION: 98 % | RESPIRATION RATE: 16 BRPM

## 2019-02-04 DIAGNOSIS — G56.03 BILATERAL CARPAL TUNNEL SYNDROME: ICD-10-CM

## 2019-02-04 DIAGNOSIS — M72.2 PLANTAR FASCIITIS, BILATERAL: Primary | ICD-10-CM

## 2019-02-04 DIAGNOSIS — E66.01 CLASS 3 SEVERE OBESITY DUE TO EXCESS CALORIES WITHOUT SERIOUS COMORBIDITY WITH BODY MASS INDEX (BMI) OF 40.0 TO 44.9 IN ADULT (HCC): ICD-10-CM

## 2019-02-04 NOTE — PROGRESS NOTES
HISTORY OF PRESENT ILLNESS  Bev John is a 57 y.o. female. HPI  Patient complains of bilateral heel pain x 1 week. She denies known injury. She denies change in activity or shoe ware. She has not used any over the counter medication or other treatment. She also complains of bilateral hand numbness in the mornings when she wakes. Numbness is followed by paresthesias, and improves over minutes. She denies weakness. Patient admits to doing a lot of chart work on the computer  She would also like labs to check for diabetes  Patient has not seen Trixie Perrin MD since 2017. Her weight is up 29 lbs since 2016. Past Medical History:   Diagnosis Date    Coagulation disorder ([facility] [address]     sickle cell trait- not disease    Ill-defined condition     Sarcoidosis      Current Outpatient Medications on File Prior to Visit   Medication Sig Dispense Refill    diclofenac EC (VOLTAREN) 75 mg EC tablet TAKE ONE TABLET BY MOUTH TWICE DAILY 60 Tab 0    fluticasone (FLONASE) 50 mcg/actuation nasal spray 2 Sprays by Both Nostrils route daily. 1 Bottle 1    DULoxetine (CYMBALTA) 60 mg capsule Take 2 Caps by mouth daily (with breakfast). 60 Cap 1    azelastine (ASTELIN) 137 mcg (0.1 %) nasal spray 1 [place] by Both Nostrils route two (2) times a day. Use in each nostril as directed 1 Bottle 3    traMADol (ULTRAM) 50 mg tablet Take 1 Tab by mouth every eight (8) hours as needed for Pain. Max Daily Amount: 150 mg. 45 Tab 0    levonorgestrel-ethinyl estradiol (ALTAVERA, 28,) 0.15-0.03 mg per tablet Take  by mouth daily. No current facility-administered medications on file prior to visit. All:  Bactrim  ROS    Physical Exam  Visit Vitals  /74   Pulse 76   Temp 97.9 °F (36.6 °C) (Oral)   Resp 16   Ht 5' 2.72\" (1.593 m)   Wt 249 lb (112.9 kg)   SpO2 98%   BMI 44.50 kg/m²    Patient is obese  Patient appears well  Heart[de-identified] normal rate, regular rhythm, normal S1, S2, no murmurs, rubs, clicks or gallops.   Chest: clear to auscultation, no wheezes, rales or rhonchi,   Extremities: no edema  Bilateral pes planus, Bilateral heel tenderness on palpation, no achilles tenderness, normal flexion extension  Bilateral hands with normal strength and sensation, negative Eryna's and Tinel's sigh  ASSESSMENT and PLAN  Bilateral plantar fasciitis   Recommended gel heel cups  Stretching exercises provided  Ice heels 3-4x daily  Aleve 1 bid  Consider Podiatry evaluation for orthotics  Bilateral Carpel Tunnel Syndrome: by history  Aleve as above  Bilateral cock up wrist splints hs  follow up in 6-8 weeks  Labs ordered: CBC, CMP, Lipids, TSH  Obesity  Advised to schedule follow up with Linda Workman MD for a physical exam

## 2019-02-04 NOTE — PROGRESS NOTES
Bilateral heel pain all day for about a week. Has bilateral hand numbness when she awakens in the morning, goes away with movement.

## 2019-02-04 NOTE — PATIENT INSTRUCTIONS
Ice heels 3-4 times daily for 20 minutes  Exercises below twice daily  Gel heel cups  Podiatry if no improvement    Bilateral velcro cock up wrist spints  Aleve 1 twice daily       Plantar Fasciitis: Exercises  Your Care Instructions  Here are some examples of typical rehabilitation exercises for your condition. Start each exercise slowly. Ease off the exercise if you start to have pain. Your doctor or physical therapist will tell you when you can start these exercises and which ones will work best for you. How to do the exercises  Towel stretch    1. Sit with your legs extended and knees straight. 2. Place a towel around your foot just under the toes. 3. Hold each end of the towel in each hand, with your hands above your knees. 4. Pull back with the towel so that your foot stretches toward you. 5. Hold the position for at least 15 to 30 seconds. 6. Repeat 2 to 4 times a session, up to 5 sessions a day. Calf stretch    1. Stand facing a wall with your hands on the wall at about eye level. Put the leg you want to stretch about a step behind your other leg. 2. Keeping your back heel on the floor, bend your front knee until you feel a stretch in the back leg. 3. Hold the stretch for 15 to 30 seconds. Repeat 2 to 4 times. Plantar fascia and calf stretch    1. Stand on a step as shown above. Be sure to hold on to the banister. 2. Slowly let your heels down over the edge of the step as you relax your calf muscles. You should feel a gentle stretch across the bottom of your foot and up the back of your leg to your knee. 3. Hold the stretch about 15 to 30 seconds, and then tighten your calf muscle a little to bring your heel back up to the level of the step. Repeat 2 to 4 times. Towel curls    1. While sitting, place your foot on a towel on the floor and scrunch the towel toward you with your toes. 2. Then, also using your toes, push the towel away from you. Miller pickups    1.  Put marbles on the floor next to a cup.  2. Using your toes, try to lift the marbles up from the floor and put them in the cup. Follow-up care is a key part of your treatment and safety. Be sure to make and go to all appointments, and call your doctor if you are having problems. It's also a good idea to know your test results and keep a list of the medicines you take. Where can you learn more? Go to http://slim-leesa.info/. Estevan Gregg in the search box to learn more about \"Plantar Fasciitis: Exercises. \"  Current as of: September 20, 2018  Content Version: 11.9  © 5855-7123 [employer]. Care instructions adapted under license by [employer] (which disclaims liability or warranty for this information). If you have questions about a medical condition or this instruction, always ask your healthcare professional. [place] [address] any warranty or liability for your use of this information.

## 2019-02-06 LAB
ALBUMIN SERPL-MCNC: 4 G/DL (ref 3.5–5.5)
ALBUMIN/GLOB SERPL: 1.5 {RATIO} (ref 1.2–2.2)
ALP SERPL-CCNC: 80 IU/L (ref 39–117)
ALT SERPL-CCNC: 19 IU/L (ref 0–32)
AST SERPL-CCNC: 18 IU/L (ref 0–40)
BASOPHILS # BLD AUTO: 0 X10E3/UL (ref 0–0.2)
BASOPHILS NFR BLD AUTO: 1 %
BILIRUB SERPL-MCNC: 0.5 MG/DL (ref 0–1.2)
BUN SERPL-MCNC: 8 MG/DL (ref 6–24)
BUN/CREAT SERPL: 9 (ref 9–23)
CALCIUM SERPL-MCNC: 9.4 MG/DL (ref 8.7–10.2)
CHLORIDE SERPL-SCNC: 104 MMOL/L (ref 96–106)
CHOLEST SERPL-MCNC: 170 MG/DL (ref 100–199)
CO2 SERPL-SCNC: 24 MMOL/L (ref 20–29)
CREAT SERPL-MCNC: 0.88 MG/DL (ref 0.57–1)
EOSINOPHIL # BLD AUTO: 0.1 X10E3/UL (ref 0–0.4)
EOSINOPHIL NFR BLD AUTO: 3 %
ERYTHROCYTE [DISTWIDTH] IN BLOOD BY AUTOMATED COUNT: 15.1 % (ref 12.3–15.4)
GLOBULIN SER CALC-MCNC: 2.6 G/DL (ref 1.5–4.5)
GLUCOSE SERPL-MCNC: 88 MG/DL (ref 65–99)
HCT VFR BLD AUTO: 38.3 % (ref 34–46.6)
HDLC SERPL-MCNC: 54 MG/DL
HGB BLD-MCNC: 12.9 G/DL (ref 11.1–15.9)
IMM GRANULOCYTES # BLD AUTO: 0 X10E3/UL (ref 0–0.1)
IMM GRANULOCYTES NFR BLD AUTO: 0 %
LDLC SERPL CALC-MCNC: 99 MG/DL (ref 0–99)
LYMPHOCYTES # BLD AUTO: 2 X10E3/UL (ref 0.7–3.1)
LYMPHOCYTES NFR BLD AUTO: 43 %
MCH RBC QN AUTO: 28.7 PG (ref 26.6–33)
MCHC RBC AUTO-ENTMCNC: 33.7 G/DL (ref 31.5–35.7)
MCV RBC AUTO: 85 FL (ref 79–97)
MONOCYTES # BLD AUTO: 0.3 X10E3/UL (ref 0.1–0.9)
MONOCYTES NFR BLD AUTO: 7 %
NEUTROPHILS # BLD AUTO: 2.2 X10E3/UL (ref 1.4–7)
NEUTROPHILS NFR BLD AUTO: 46 %
PLATELET # BLD AUTO: 332 X10E3/UL (ref 150–379)
POTASSIUM SERPL-SCNC: 4.2 MMOL/L (ref 3.5–5.2)
PROT SERPL-MCNC: 6.6 G/DL (ref 6–8.5)
RBC # BLD AUTO: 4.5 X10E6/UL (ref 3.77–5.28)
SODIUM SERPL-SCNC: 140 MMOL/L (ref 134–144)
TRIGL SERPL-MCNC: 86 MG/DL (ref 0–149)
TSH SERPL DL<=0.005 MIU/L-ACNC: 1.86 UIU/ML (ref 0.45–4.5)
VLDLC SERPL CALC-MCNC: 17 MG/DL (ref 5–40)
WBC # BLD AUTO: 4.6 X10E3/UL (ref 3.4–10.8)

## 2019-02-07 ENCOUNTER — TELEPHONE (OUTPATIENT)
Dept: INTERNAL MEDICINE CLINIC | Age: 49
End: 2019-02-07

## 2019-02-07 NOTE — TELEPHONE ENCOUNTER
Anna Branham ([facility]) [phone] (H)     Pt would like lab results sent to "[facility]

## 2019-02-08 ENCOUNTER — TELEPHONE (OUTPATIENT)
Dept: INTERNAL MEDICINE CLINIC | Age: 49
End: 2019-02-08

## 2019-02-08 NOTE — TELEPHONE ENCOUNTER
Informed patient per provider result note sent by [address] Patient verbalized understanding.

## 2020-03-12 ENCOUNTER — HOSPITAL ENCOUNTER (EMERGENCY)
Age: 50
Discharge: HOME | End: 2020-03-12
Attending: EMERGENCY MEDICINE
Payer: COMMERCIAL

## 2020-03-12 VITALS
BODY MASS INDEX: 37.62 KG/M2 | HEIGHT: 63 IN | OXYGEN SATURATION: 98 % | HEART RATE: 82 BPM | WEIGHT: 212.3 LBS | SYSTOLIC BLOOD PRESSURE: 138 MMHG | RESPIRATION RATE: 16 BRPM | DIASTOLIC BLOOD PRESSURE: 88 MMHG | TEMPERATURE: 98.7 F

## 2020-03-12 DIAGNOSIS — J11.1 INFLUENZA-LIKE ILLNESS: Primary | ICD-10-CM

## 2020-03-12 LAB
FLUAV AG NPH QL IA: NEGATIVE
FLUBV AG NOSE QL IA: NEGATIVE

## 2020-03-12 PROCEDURE — 87804 INFLUENZA ASSAY W/OPTIC: CPT

## 2020-03-12 PROCEDURE — 99282 EMERGENCY DEPT VISIT SF MDM: CPT

## 2020-03-12 RX ORDER — CETIRIZINE HYDROCHLORIDE 10 MG/1
CAPSULE, LIQUID FILLED ORAL
COMMUNITY
End: 2022-07-14

## 2020-03-12 RX ORDER — ACETAMINOPHEN 325 MG/1
650 TABLET ORAL
COMMUNITY
End: 2022-07-14

## 2020-03-12 NOTE — ED TRIAGE NOTES
Pt c/o weakness, cough, sore throat and headache x 2 days. Pt states has taken Zyrtec and tylenol without relief.

## 2020-03-12 NOTE — LETTER
[address] [facility] EMERGENCY DEPT 
[address] [facility] [place] [address] 
[phone] Work/School Note Date: 3/12/2020 To Whom It May concern: 
 
Jo Ann Sibley was seen and treated today in the emergency room by the following provider(s): 
Attending Provider: Svetlana Goodrich MD. Jo Ann Sibley may return to work on 3/14/2020. Sincerely, Vaughn Kennedy RN

## 2020-03-13 NOTE — DISCHARGE INSTRUCTIONS
Patient Education        Viral Infections: Care Instructions  Your Care Instructions    You don't feel well, but it's not clear what's causing it. You may have a viral infection. Viruses cause many illnesses, such as the common cold, influenza, fever, rashes, and the diarrhea, nausea, and vomiting that are often called \"stomach flu. \" You may wonder if antibiotic medicines could make you feel better. But antibiotics only treat infections caused by bacteria. They don't work on viruses. The good news is that viral infections usually aren't serious. Most will go away in a few days without medical treatment. In the meantime, there are a few things you can do to make yourself more comfortable. Follow-up care is a key part of your treatment and safety. Be sure to make and go to all appointments, and call your doctor if you are having problems. It's also a good idea to know your test results and keep a list of the medicines you take. How can you care for yourself at home? · Get plenty of rest if you feel tired. · Take an over-the-counter pain medicine if needed, such as acetaminophen (Tylenol), ibuprofen (Advil, Motrin), or naproxen (Aleve). Read and follow all instructions on the label. · Be careful when taking over-the-counter cold or flu medicines and Tylenol at the same time. Many of these medicines have acetaminophen, which is Tylenol. Read the labels to make sure that you are not taking more than the recommended dose. Too much acetaminophen (Tylenol) can be harmful. · Drink plenty of fluids, enough so that your urine is light yellow or clear like water. If you have kidney, heart, or liver disease and have to limit fluids, talk with your doctor before you increase the amount of fluids you drink. · Stay home from work, school, and other public places while you have a fever. When should you call for help? Call 911 anytime you think you may need emergency care.  For example, call if:    · You have severe trouble breathing.     · You passed out (lost consciousness).    Call your doctor now or seek immediate medical care if:    · You seem to be getting much sicker.     · You have a new or higher fever.     · You have blood in your stools.     · You have new belly pain, or your pain gets worse.     · You have a new rash.    Watch closely for changes in your health, and be sure to contact your doctor if:    · You start to get better and then get worse.     · You do not get better as expected. Where can you learn more? Go to http://slim-leesa.info/  Enter L906 in the search box to learn more about \"Viral Infections: Care Instructions. \"  Current as of: January 26, 2020Content Version: 12.4  © 0048-8862 [employer]. Care instructions adapted under license by [employer] (which disclaims liability or warranty for this information). If you have questions about a medical condition or this instruction, always ask your healthcare professional. [address] any warranty or liability for your use of this information.

## 2020-03-13 NOTE — ED PROVIDER NOTES
History of sarcoidosis. She presents with a 2-day history of cough mildly productive of yellow sputum, chills, headache, body aches, fatigue. She denies fever. She has taken Tylenol with limited relief. She has had good p.o. intake. She denies dyspnea. No known sick contacts. She recently flew to [place] and returned earlier this week.            Past Medical History:   Diagnosis Date    Coagulation disorder (HCC)     sickle cell trait- not disease    Ill-defined condition     Sarcoidosis       Past Surgical History:   Procedure Laterality Date    HX  SECTION      HX GYN      tubal ligation    HX HEENT      wisdom teeth extracted    HX LAP CHOLECYSTECTOMY           Family History:   Problem Relation Age of Onset    Hypertension Mother    Jazmyn Brenna Gout Mother     Hypertension Father     Diabetes Father     Arthritis-osteo Father     Sickle Cell Trait Father     Sickle Cell Trait Sister     Sickle Cell Trait Brother     Sickle Cell Trait Brother     Sickle Cell Anemia Brother     No Known Problems Other     No Known Problems Son     No Known Problems Son     No Known Problems Daughter     No Known Problems Daughter     No Known Problems Daughter     No Known Problems Daughter        Social History     Socioeconomic History    Marital status: SINGLE     Spouse name: Not on file    Number of children: Not on file    Years of education: Not on file    Highest education level: Not on file   Occupational History    Not on file   Social Needs    Financial resource strain: Not on file    Food insecurity     Worry: Not on file     Inability: Not on file    Transportation needs     Medical: Not on file     Non-medical: Not on file   Tobacco Use    Smoking status: Never Smoker    Smokeless tobacco: Never Used   Substance and Sexual Activity    Alcohol use: No     Alcohol/week: 0.0 standard drinks    Drug use: No    Sexual activity: Yes     Partners: Male     Birth control/protection: I.U.D. Lifestyle    Physical activity     Days per week: Not on file     Minutes per session: Not on file    Stress: Not on file   Relationships    Social connections     Talks on phone: Not on file     Gets together: Not on file     Attends Denominational service: Not on file     Active member of club or organization: Not on file     Attends meetings of clubs or organizations: Not on file     Relationship status: Not on file    Intimate partner violence     Fear of current or ex partner: Not on file     Emotionally abused: Not on file     Physically abused: Not on file     Forced sexual activity: Not on file   Other Topics Concern    Not on file   Social History Narrative    ** Merged History Encounter **              ALLERGIES: Bactrim [sulfamethoprim ds]    Review of Systems   All other systems reviewed and are negative. Vitals:    03/12/20 1958 03/12/20 2005 03/12/20 2114   BP: 141/88  138/88   Pulse: 88  82   Resp: 16  16   Temp: 98.7 °F (37.1 °C)     SpO2: 98% 98%    Weight: 96.3 kg (212 lb 4.9 oz)     Height: 5' 3\" (1.6 m)              Physical Exam  Vitals signs and nursing note reviewed. Constitutional:       Appearance: She is well-developed. Comments: Mildly ill-appearing. Nontoxic appearance. HENT:      Head: Normocephalic and atraumatic. Eyes:      Conjunctiva/sclera: Conjunctivae normal.   Neck:      Musculoskeletal: Neck supple. Trachea: No tracheal deviation. Cardiovascular:      Rate and Rhythm: Normal rate and regular rhythm. Heart sounds: Normal heart sounds. No murmur. No friction rub. No gallop. Pulmonary:      Effort: Pulmonary effort is normal.      Breath sounds: Normal breath sounds. Abdominal:      Palpations: Abdomen is soft. Tenderness: There is no abdominal tenderness. Musculoskeletal:         General: No deformity. Skin:     General: Skin is warm and dry. Neurological:      Mental Status: She is alert.       Comments: oriented          MDM Procedures    Progress Note:  Results, treatment, and follow up plan have been discussed with patient/family. Questions were answered. Tonny Palma MD    Assessment/plan: Influenza-like illness -rapid flu test negative. Reassuring appearance/exam with normal vital signs. Home with recommendations of rest, fluids, Tylenol/ibuprofen. PCP follow-up.   Tonny Palma MD

## 2022-03-18 PROBLEM — E66.01 OBESITY, MORBID: Status: ACTIVE | Noted: 2019-02-04

## 2022-07-12 ENCOUNTER — APPOINTMENT (OUTPATIENT)
Dept: CT IMAGING | Age: 52
End: 2022-07-12
Attending: NURSE PRACTITIONER
Payer: COMMERCIAL

## 2022-07-12 ENCOUNTER — APPOINTMENT (OUTPATIENT)
Dept: GENERAL RADIOLOGY | Age: 52
End: 2022-07-12
Attending: EMERGENCY MEDICINE
Payer: COMMERCIAL

## 2022-07-12 ENCOUNTER — HOSPITAL ENCOUNTER (OUTPATIENT)
Age: 52
Setting detail: OBSERVATION
Discharge: HOME | End: 2022-07-14
Attending: EMERGENCY MEDICINE | Admitting: HOSPITALIST
Payer: COMMERCIAL

## 2022-07-12 DIAGNOSIS — R07.9 CHEST PAIN, UNSPECIFIED TYPE: Primary | ICD-10-CM

## 2022-07-12 PROBLEM — R11.2 INTRACTABLE NAUSEA AND VOMITING: Status: ACTIVE | Noted: 2022-07-12

## 2022-07-12 PROBLEM — R42 DIZZINESS: Status: ACTIVE | Noted: 2022-07-12

## 2022-07-12 PROBLEM — R11.0 NAUSEA: Status: ACTIVE | Noted: 2022-07-12

## 2022-07-12 LAB
ANION GAP SERPL CALC-SCNC: 8 MMOL/L (ref 5–15)
ATRIAL RATE: 85 BPM
BASOPHILS # BLD: 0 K/UL (ref 0–0.1)
BASOPHILS NFR BLD: 1 % (ref 0–1)
BUN SERPL-MCNC: 15 MG/DL (ref 6–20)
BUN/CREAT SERPL: 16 (ref 12–20)
CA-I BLD-MCNC: 9.4 MG/DL (ref 8.5–10.1)
CALCULATED P AXIS, ECG09: 61 DEGREES
CALCULATED R AXIS, ECG10: 46 DEGREES
CALCULATED T AXIS, ECG11: 42 DEGREES
CHLORIDE SERPL-SCNC: 103 MMOL/L (ref 97–108)
CO2 SERPL-SCNC: 30 MMOL/L (ref 21–32)
CREAT SERPL-MCNC: 0.95 MG/DL (ref 0.55–1.02)
D DIMER PPP FEU-MCNC: 0.4 UG/ML(FEU)
DIAGNOSIS, 93000: NORMAL
DIFFERENTIAL METHOD BLD: ABNORMAL
EOSINOPHIL # BLD: 0.1 K/UL (ref 0–0.4)
EOSINOPHIL NFR BLD: 2 % (ref 0–7)
ERYTHROCYTE [DISTWIDTH] IN BLOOD BY AUTOMATED COUNT: 13.3 % (ref 11.5–14.5)
GLUCOSE SERPL-MCNC: 99 MG/DL (ref 65–100)
HCT VFR BLD AUTO: 39.3 % (ref 35–47)
HGB BLD-MCNC: 13.1 G/DL (ref 11.5–16)
IMM GRANULOCYTES # BLD AUTO: 0 K/UL (ref 0–0.04)
IMM GRANULOCYTES NFR BLD AUTO: 1 % (ref 0–0.5)
LYMPHOCYTES # BLD: 1.8 K/UL (ref 0.8–3.5)
LYMPHOCYTES NFR BLD: 32 % (ref 12–49)
MCH RBC QN AUTO: 29.4 PG (ref 26–34)
MCHC RBC AUTO-ENTMCNC: 33.3 G/DL (ref 30–36.5)
MCV RBC AUTO: 88.3 FL (ref 80–99)
MONOCYTES # BLD: 0.5 K/UL (ref 0–1)
MONOCYTES NFR BLD: 10 % (ref 5–13)
NEUTS SEG # BLD: 3 K/UL (ref 1.8–8)
NEUTS SEG NFR BLD: 54 % (ref 32–75)
P-R INTERVAL, ECG05: 178 MS
PLATELET # BLD AUTO: 290 K/UL (ref 150–400)
PMV BLD AUTO: 11 FL (ref 8.9–12.9)
POTASSIUM SERPL-SCNC: 3.8 MMOL/L (ref 3.5–5.1)
Q-T INTERVAL, ECG07: 368 MS
QRS DURATION, ECG06: 74 MS
QTC CALCULATION (BEZET), ECG08: 437 MS
RBC # BLD AUTO: 4.45 M/UL (ref 3.8–5.2)
SODIUM SERPL-SCNC: 141 MMOL/L (ref 136–145)
TROPONIN-HIGH SENSITIVITY: 21 NG/L (ref 0–51)
TROPONIN-HIGH SENSITIVITY: 22 NG/L (ref 0–51)
VENTRICULAR RATE, ECG03: 85 BPM
WBC # BLD AUTO: 5.4 K/UL (ref 3.6–11)

## 2022-07-12 PROCEDURE — 85379 FIBRIN DEGRADATION QUANT: CPT

## 2022-07-12 PROCEDURE — 96375 TX/PRO/DX INJ NEW DRUG ADDON: CPT

## 2022-07-12 PROCEDURE — 93005 ELECTROCARDIOGRAM TRACING: CPT

## 2022-07-12 PROCEDURE — 85025 COMPLETE CBC W/AUTO DIFF WBC: CPT

## 2022-07-12 PROCEDURE — 74011000250 HC RX REV CODE- 250: Performed by: EMERGENCY MEDICINE

## 2022-07-12 PROCEDURE — 96374 THER/PROPH/DIAG INJ IV PUSH: CPT

## 2022-07-12 PROCEDURE — 96376 TX/PRO/DX INJ SAME DRUG ADON: CPT

## 2022-07-12 PROCEDURE — 74011250637 HC RX REV CODE- 250/637: Performed by: NURSE PRACTITIONER

## 2022-07-12 PROCEDURE — G0378 HOSPITAL OBSERVATION PER HR: HCPCS

## 2022-07-12 PROCEDURE — 80048 BASIC METABOLIC PNL TOTAL CA: CPT

## 2022-07-12 PROCEDURE — 86141 C-REACTIVE PROTEIN HS: CPT

## 2022-07-12 PROCEDURE — C9113 INJ PANTOPRAZOLE SODIUM, VIA: HCPCS | Performed by: NURSE PRACTITIONER

## 2022-07-12 PROCEDURE — 74011250636 HC RX REV CODE- 250/636: Performed by: NURSE PRACTITIONER

## 2022-07-12 PROCEDURE — 74011000250 HC RX REV CODE- 250: Performed by: NURSE PRACTITIONER

## 2022-07-12 PROCEDURE — 84484 ASSAY OF TROPONIN QUANT: CPT

## 2022-07-12 PROCEDURE — 74011250637 HC RX REV CODE- 250/637: Performed by: EMERGENCY MEDICINE

## 2022-07-12 PROCEDURE — 99285 EMERGENCY DEPT VISIT HI MDM: CPT

## 2022-07-12 PROCEDURE — 74011000636 HC RX REV CODE- 636: Performed by: NURSE PRACTITIONER

## 2022-07-12 PROCEDURE — 71045 X-RAY EXAM CHEST 1 VIEW: CPT

## 2022-07-12 PROCEDURE — 71275 CT ANGIOGRAPHY CHEST: CPT

## 2022-07-12 PROCEDURE — 36415 COLL VENOUS BLD VENIPUNCTURE: CPT

## 2022-07-12 RX ORDER — ACETAMINOPHEN 650 MG/1
650 SUPPOSITORY RECTAL
Status: DISCONTINUED | OUTPATIENT
Start: 2022-07-12 | End: 2022-07-14 | Stop reason: HOSPADM

## 2022-07-12 RX ORDER — POLYETHYLENE GLYCOL 3350 17 G/17G
17 POWDER, FOR SOLUTION ORAL DAILY PRN
Status: DISCONTINUED | OUTPATIENT
Start: 2022-07-12 | End: 2022-07-14 | Stop reason: HOSPADM

## 2022-07-12 RX ORDER — COLCHICINE 0.6 MG/1
0.6 TABLET ORAL DAILY
Status: DISCONTINUED | OUTPATIENT
Start: 2022-07-12 | End: 2022-07-14 | Stop reason: HOSPADM

## 2022-07-12 RX ORDER — LIDOCAINE HYDROCHLORIDE 20 MG/ML
15 SOLUTION OROPHARYNGEAL
Status: COMPLETED | OUTPATIENT
Start: 2022-07-12 | End: 2022-07-12

## 2022-07-12 RX ORDER — ONDANSETRON 2 MG/ML
4 INJECTION INTRAMUSCULAR; INTRAVENOUS
Status: DISCONTINUED | OUTPATIENT
Start: 2022-07-12 | End: 2022-07-14 | Stop reason: HOSPADM

## 2022-07-12 RX ORDER — DIAZEPAM 5 MG/1
5 TABLET ORAL
Status: DISCONTINUED | OUTPATIENT
Start: 2022-07-12 | End: 2022-07-14 | Stop reason: HOSPADM

## 2022-07-12 RX ORDER — MAG HYDROX/ALUMINUM HYD/SIMETH 200-200-20
30 SUSPENSION, ORAL (FINAL DOSE FORM) ORAL ONCE
Status: COMPLETED | OUTPATIENT
Start: 2022-07-12 | End: 2022-07-12

## 2022-07-12 RX ORDER — NITROGLYCERIN 0.4 MG/1
0.4 TABLET SUBLINGUAL
Status: COMPLETED | OUTPATIENT
Start: 2022-07-12 | End: 2022-07-12

## 2022-07-12 RX ORDER — MORPHINE SULFATE 2 MG/ML
1 INJECTION, SOLUTION INTRAMUSCULAR; INTRAVENOUS
Status: DISCONTINUED | OUTPATIENT
Start: 2022-07-12 | End: 2022-07-14 | Stop reason: HOSPADM

## 2022-07-12 RX ORDER — ONDANSETRON 4 MG/1
4 TABLET, ORALLY DISINTEGRATING ORAL
Status: DISCONTINUED | OUTPATIENT
Start: 2022-07-12 | End: 2022-07-14 | Stop reason: HOSPADM

## 2022-07-12 RX ORDER — SODIUM CHLORIDE 0.9 % (FLUSH) 0.9 %
5-40 SYRINGE (ML) INJECTION EVERY 8 HOURS
Status: DISCONTINUED | OUTPATIENT
Start: 2022-07-12 | End: 2022-07-14 | Stop reason: HOSPADM

## 2022-07-12 RX ORDER — SODIUM CHLORIDE 9 MG/ML
75 INJECTION, SOLUTION INTRAVENOUS CONTINUOUS
Status: DISCONTINUED | OUTPATIENT
Start: 2022-07-12 | End: 2022-07-14 | Stop reason: HOSPADM

## 2022-07-12 RX ORDER — SODIUM CHLORIDE 0.9 % (FLUSH) 0.9 %
5-40 SYRINGE (ML) INJECTION AS NEEDED
Status: DISCONTINUED | OUTPATIENT
Start: 2022-07-12 | End: 2022-07-14 | Stop reason: HOSPADM

## 2022-07-12 RX ORDER — ACETAMINOPHEN 325 MG/1
650 TABLET ORAL
Status: DISCONTINUED | OUTPATIENT
Start: 2022-07-12 | End: 2022-07-14 | Stop reason: HOSPADM

## 2022-07-12 RX ADMIN — ONDANSETRON 4 MG: 2 INJECTION INTRAMUSCULAR; INTRAVENOUS at 16:50

## 2022-07-12 RX ADMIN — SODIUM CHLORIDE, PRESERVATIVE FREE 40 MG: 5 INJECTION INTRAVENOUS at 20:43

## 2022-07-12 RX ADMIN — DIAZEPAM 5 MG: 5 TABLET ORAL at 20:43

## 2022-07-12 RX ADMIN — LIDOCAINE HYDROCHLORIDE 15 ML: 20 SOLUTION ORAL; TOPICAL at 09:10

## 2022-07-12 RX ADMIN — IOPAMIDOL 100 ML: 755 INJECTION, SOLUTION INTRAVENOUS at 18:23

## 2022-07-12 RX ADMIN — MORPHINE SULFATE 1 MG: 2 INJECTION, SOLUTION INTRAMUSCULAR; INTRAVENOUS at 20:44

## 2022-07-12 RX ADMIN — MORPHINE SULFATE 1 MG: 2 INJECTION, SOLUTION INTRAMUSCULAR; INTRAVENOUS at 16:36

## 2022-07-12 RX ADMIN — ALUMINUM HYDROXIDE, MAGNESIUM HYDROXIDE, AND SIMETHICONE 30 ML: 200; 200; 20 SUSPENSION ORAL at 09:10

## 2022-07-12 RX ADMIN — COLCHICINE 0.6 MG: 0.6 TABLET, FILM COATED ORAL at 18:36

## 2022-07-12 RX ADMIN — NITROGLYCERIN 0.4 MG: 0.4 TABLET, ORALLY DISINTEGRATING SUBLINGUAL at 10:47

## 2022-07-12 RX ADMIN — SODIUM CHLORIDE, PRESERVATIVE FREE 10 ML: 5 INJECTION INTRAVENOUS at 22:27

## 2022-07-12 NOTE — H&P
Rey MITCHELL, FNP-C    History and Physical      Patient: Ronda Castro MRN: [id number]  SSN: [id number]    YOB: 1970  Age: 50 y.o. Sex: female        Chief Complaint   Patient presents with    Chest Pain       Subjective:      Ronda Castro is a 50 y.o. female who presents via EMS for cc of chest pain, onset last night. She reports driving to work this morning pain continued to worsen, accompanied with sob and dizziness. she subsequently pulled her car over and call 911. She reports feeling palpitations and nausea. Pain characterized as tightness, non radiating. Pain is non reproducible. PM significant for sarcoidosis, djd, borderline hypertension. She reports covid around 2022 and has continued to decline in physical health every since. Reports having a stress test with [facility] cardiology 2022. In the ED troponin x 2 negative. CXR negative for acute pulmonary findings. Routine labwork unremarkable. Hospitalist team consulted for further evaluation and treatment. On examination she continues to report chest pain, mid sternal non radiating. Not improved with medication or rest, Non reproducible. Will admit to observation status. Consult cardiology. Patient elects full code. [address] [phone].     Past Medical History:   Diagnosis Date    Coagulation disorder ([facility] [address]     sickle cell trait- not disease    Ill-defined condition     Sarcoidosis    Sarcoidosis      Past Surgical History:   Procedure Laterality Date    HX  SECTION      HX GYN      tubal ligation    HX HEENT      wisdom teeth extracted    HX LAP CHOLECYSTECTOMY        Family History   Problem Relation Age of Onset    Hypertension Mother    Marjorie [place] Gout Mother     Hypertension Father     Diabetes Father     OSTEOARTHRITIS Father     Sickle Cell Trait Father     Sickle Cell Trait Sister     Sickle Cell Trait Brother     Sickle Cell Trait Brother     Sickle Cell Anemia Brother     No Known Problems Other     No Known Problems Son     No Known Problems Son     No Known Problems Daughter     No Known Problems Daughter     No Known Problems Daughter     No Known Problems Daughter      Social History     Tobacco Use    Smoking status: Never Smoker    Smokeless tobacco: Never Used   Substance Use Topics    Alcohol use: No     Alcohol/week: 0.0 standard drinks     Comment: Occ      Prior to Admission medications    Medication Sig Start Date End Date Taking? Authorizing Provider   acetaminophen (TylenoL) 325 mg tablet Take 650 mg by mouth every four (4) hours as needed for Pain. Patient not taking: Reported on 7/12/2022    Other, MD Mabel   Cetirizine (ZyrTEC) 10 mg cap Take  by mouth. Patient not taking: Reported on 7/12/2022    Mabel Agee MD   diclofenac EC (VOLTAREN) 75 mg EC tablet TAKE ONE TABLET BY MOUTH TWICE DAILY  Patient not taking: Reported on 7/12/2022 6/27/17   Halina Galeazzi, MD   fluticasone [facility]) 50 mcg/actuation nasal spray 2 Sprays by Both Nostrils route daily. Patient not taking: Reported on 7/12/2022 2/20/17   Halina Galeazzi, MD   DULoxetine (CYMBALTA) 60 mg capsule Take 2 Caps by mouth daily (with breakfast). Patient not taking: Reported on 7/12/2022 2/20/17   Halina Galeazzi, MD   azelastine (ASTELIN) 137 mcg (0.1 %) nasal spray 1 [place] by Both Nostrils route two (2) times a day. Use in each nostril as directed  Patient not taking: Reported on 7/12/2022 3/30/16   Halina Galeazzi, MD   traMADol Kaylen Estelita) 50 mg tablet Take 1 Tab by mouth every eight (8) hours as needed for Pain. Max Daily Amount: 150 mg. Patient not taking: Reported on 7/12/2022 3/2/16   Halina Galeazzi, MD   levonorgestrel-ethinyl estradiol (ALTAVERA, 28,) 0.15-0.03 mg per tablet Take  by mouth daily.   Patient not taking: Reported on 7/12/2022    Provider, Historical        Allergies   Allergen Reactions    Bactrim [Sulfamethoprim Ds] Hives and Other (comments)     flushing       Review of Systems:  Review of Systems   Constitutional: Negative for chills and fever. Respiratory: Positive for shortness of breath. Cardiovascular: Positive for chest pain and leg swelling. Gastrointestinal: Negative for abdominal pain and heartburn. Genitourinary: Negative for dysuria and hematuria. Musculoskeletal: Negative for myalgias. Neurological: Positive for dizziness. Psychiatric/Behavioral: Negative for depression, substance abuse and suicidal ideas. Objective:     Vitals:    07/12/22 0840 07/12/22 1140 07/12/22 1358 07/12/22 1515   BP: 126/84 119/75 138/76 96/69   Pulse: 93 81 82 (!) 102   Resp: 18 18 18 16   Temp: 98.2 °F (36.8 °C)   97.9 °F (36.6 °C)   SpO2: 99% 99% 99% 100%   Weight: 110.2 kg (243 lb)      Height: 5' 3\" (1.6 m)           Physical Exam:  Physical Exam  Vitals and nursing note reviewed. Constitutional:       Appearance: She is obese. Eyes:      Extraocular Movements: Extraocular movements intact. Cardiovascular:      Rate and Rhythm: Normal rate. Heart sounds: Normal heart sounds. Pulmonary:      Breath sounds: Normal breath sounds. Abdominal:      General: Bowel sounds are normal.   Musculoskeletal:      Comments: Traced edema bilateral lower extremities   Skin:     General: Skin is warm and dry. Capillary Refill: Capillary refill takes less than 2 seconds. Neurological:      Mental Status: She is alert and oriented to person, place, and time. Psychiatric:         Mood and Affect: Mood normal.         Behavior: Behavior normal.          Assessment:     1. Chest pain  2. Nausea  3. Dizziness  4.  Shortness of breath  Plan:   Observation admission  Cardiac cath tomorrow at 4:30pm  Morphine prn for pain, 1mg q4hrs  Valium 5mg q6hrs prn anxiety  Prn zofran  Obtain CTA neck, CTA chest r/o PE, D dimer pending  Obtain echocardiogram  Cardiology consulted  Protonix bid        Full Code  GI PROPHYLAXIS protonix  DVT PROPHYLAXIS none  Home medications reviewed and reconciled    Above treatment plan reviewed and discussed with patient in detail at bedside, all questions answered.      Time Spent: > 75 minute    Signed By: Monroe Vang NP     July 12, 2022

## 2022-07-12 NOTE — Clinical Note
Patient Class[de-identified] OBSERVATION [104]   Type of Bed: Telemetry [19]   Cardiac Monitoring Required?: Yes   Reason for Observation: chest pain   Admitting Diagnosis: Chest pain [[id number]]   Admitting Physician: Felix Mota [[id number]]   Attending Physician: Felix Mota [[id number]]

## 2022-07-12 NOTE — ED TRIAGE NOTES
Pt BIB EMS from home for c/o CP that started last night. Reports that she had dizziness and SOB this morning with some nausea. Was at rest when her pain started.  ASA given by EMS

## 2022-07-12 NOTE — PROGRESS NOTES
Primary Nurse Nikko Watkins RN and Jairo Hollingsworth RN performed a dual skin assessment on this patient > Skin is intact. Todd score is 22.

## 2022-07-12 NOTE — Clinical Note
TRANSFER - OUT REPORT:     Verbal report given to: Aamir Newman, (at bedside). Report consisted of patient's Situation, Background, Assessment and   Recommendations(SBAR). Opportunity for questions and clarification was provided. Patient transported with a Registered Nurse. Patient transported to: PACU.

## 2022-07-12 NOTE — ED PROVIDER NOTES
EMERGENCY DEPARTMENT HISTORY AND PHYSICAL EXAM      Date: 7/12/2022  Patient Name: Darrian Quintero    History of Presenting Illness     Chief Complaint   Patient presents with    Chest Pain       History Provided By: Patient    HPI: Darrian Quintero, 50 y.o. female with history of sarcoidosis and arthritis who presents with chest pain. States this symptom started last night around 10 PM in bed. Pain is in the center of her chest and radiates to her back. Pain has been constant, 8/10, sharp, and without exacerbating or relieving symptoms. She did try Codi Guess however this did not help. This morning she was going to work and felt dizzy and nauseated. She was given aspirin by EMS and transported here. She did have a stress test about 3 months ago that was normal by her cardiologist due to shortness of breath. Denies any leg edema, history of PE or DVT. She is not a smoker. Family history of heart disease in her grandfather. There are no other complaints, changes, or physical findings at this time. PCP: Mary Swift MD    Current Facility-Administered Medications   Medication Dose Route Frequency Provider Last Rate Last Admin    0.9% sodium chloride infusion  75 mL/hr IntraVENous CONTINUOUS Lynette Bonilla DO         Current Outpatient Medications   Medication Sig Dispense Refill    acetaminophen (TylenoL) 325 mg tablet Take 650 mg by mouth every four (4) hours as needed for Pain.  Cetirizine (ZyrTEC) 10 mg cap Take  by mouth.  diclofenac EC (VOLTAREN) 75 mg EC tablet TAKE ONE TABLET BY MOUTH TWICE DAILY 60 Tab 0    fluticasone (FLONASE) 50 mcg/actuation nasal spray 2 Sprays by Both Nostrils route daily. 1 Bottle 1    DULoxetine (CYMBALTA) 60 mg capsule Take 2 Caps by mouth daily (with breakfast). 60 Cap 1    azelastine (ASTELIN) 137 mcg (0.1 %) nasal spray 1 [place] by Both Nostrils route two (2) times a day.  Use in each nostril as directed 1 Bottle 3    traMADol (ULTRAM) 50 mg tablet Take 1 Tab by mouth every eight (8) hours as needed for Pain. Max Daily Amount: 150 mg. 45 Tab 0    levonorgestrel-ethinyl estradiol (ALTAVERA, 28,) 0.15-0.03 mg per tablet Take  by mouth daily. Past History   Past Medical History:  Past Medical History:   Diagnosis Date    Coagulation disorder (HCC)     sickle cell trait- not disease    Ill-defined condition     Sarcoidosis    Sarcoidosis       Past Surgical History:  Past Surgical History:   Procedure Laterality Date    HX  SECTION      HX GYN      tubal ligation    HX HEENT      wisdom teeth extracted    HX LAP CHOLECYSTECTOMY         Family History:  Family History   Problem Relation Age of Onset    Hypertension Mother    Maharaj Gout Mother     Hypertension Father     Diabetes Father     OSTEOARTHRITIS Father     Sickle Cell Trait Father     Sickle Cell Trait Sister     Sickle Cell Trait Brother     Sickle Cell Trait Brother     Sickle Cell Anemia Brother     No Known Problems Other     No Known Problems Son     No Known Problems Son     No Known Problems Daughter     No Known Problems Daughter     No Known Problems Daughter     No Known Problems Daughter        Social History:  Social History     Tobacco Use    Smoking status: Never Smoker    Smokeless tobacco: Never Used   Substance Use Topics    Alcohol use: No     Alcohol/week: 0.0 standard drinks     Comment: Occ    Drug use: No       Allergies: Allergies   Allergen Reactions    Bactrim [Sulfamethoprim Ds] Hives and Other (comments)     flushing      Review of Systems   Review of Systems   Constitutional: Negative for fever. HENT: Negative for congestion. Eyes: Negative for visual disturbance. Respiratory: Negative for shortness of breath. Cardiovascular: Positive for chest pain. Gastrointestinal: Negative for abdominal pain. Genitourinary: Negative for dysuria. Musculoskeletal: Negative for arthralgias. Skin: Negative for rash.    Neurological: Negative for headaches. Physical Exam   Constitutional: No acute distress. Well-nourished. Skin: No rash. ENT: No rhinorrhea. No cough. Head is normocephalic and atraumatic. Eye: No proptosis or conjunctival injections. Respiratory: No apparent respiratory distress. Lung sounds are clear bilaterally. Gastrointestinal: Nondistended. Musculoskeletal: No obvious bony deformities. Cardio: Regular rate and rhythm. No murmurs. 2+ radial pulses. Lab and Diagnostic Study Results   Labs -     Recent Results (from the past 12 hour(s))   CBC WITH AUTOMATED DIFF    Collection Time: 07/12/22  8:41 AM   Result Value Ref Range    WBC 5.4 3.6 - 11.0 K/uL    RBC 4.45 3.80 - 5.20 M/uL    HGB 13.1 11.5 - 16.0 g/dL    HCT 39.3 35.0 - 47.0 %    MCV 88.3 80.0 - 99.0 FL    MCH 29.4 26.0 - 34.0 PG    MCHC 33.3 30.0 - 36.5 g/dL    RDW 13.3 11.5 - 14.5 %    PLATELET 378 304 - 226 K/uL    MPV 11.0 8.9 - 12.9 FL    NEUTROPHILS 54 32 - 75 %    LYMPHOCYTES 32 12 - 49 %    MONOCYTES 10 5 - 13 %    EOSINOPHILS 2 0 - 7 %    BASOPHILS 1 0 - 1 %    IMMATURE GRANULOCYTES 1 (H) 0.0 - 0.5 %    ABS. NEUTROPHILS 3.0 1.8 - 8.0 K/UL    ABS. LYMPHOCYTES 1.8 0.8 - 3.5 K/UL    ABS. MONOCYTES 0.5 0.0 - 1.0 K/UL    ABS. EOSINOPHILS 0.1 0.0 - 0.4 K/UL    ABS. BASOPHILS 0.0 0.0 - 0.1 K/UL    ABS. IMM.  GRANS. 0.0 0.00 - 0.04 K/UL    DF AUTOMATED     METABOLIC PANEL, BASIC    Collection Time: 07/12/22  8:41 AM   Result Value Ref Range    Sodium 141 136 - 145 mmol/L    Potassium 3.8 3.5 - 5.1 mmol/L    Chloride 103 97 - 108 mmol/L    CO2 30 21 - 32 mmol/L    Anion gap 8 5 - 15 mmol/L    Glucose 99 65 - 100 mg/dL    BUN 15 6 - 20 mg/dL    Creatinine 0.95 0.55 - 1.02 mg/dL    BUN/Creatinine ratio 16 12 - 20      GFR est AA >60 >60 ml/min/1.73m2    GFR est non-AA >60 >60 ml/min/1.73m2    Calcium 9.4 8.5 - 10.1 mg/dL   TROPONIN-HIGH SENSITIVITY    Collection Time: 07/12/22  8:41 AM   Result Value Ref Range    Troponin-High Sensitivity 22 0 - 51 ng/L TROPONIN-HIGH SENSITIVITY    Collection Time: 07/12/22 10:37 AM   Result Value Ref Range    Troponin-High Sensitivity 21 0 - 51 ng/L       Radiologic Studies -   [unfilled]  CT Results  (Last 48 hours)    None        CXR Results  (Last 48 hours)               07/12/22 0912  XR CHEST SNGL V Final result    Impression:  No acute cardiopulmonary abnormality. Narrative:  EXAM:  XR CHEST SNGL V       INDICATION:  Chest pain       COMPARISON: None. TECHNIQUE: AP portable chest radiograph. FINDINGS: The lungs are clear. Heart size and mediastinal contours are normal.   No pleural effusion or pneumothorax. Bones are age-appropriate. Medical Decision Making and ED Course   - I am the first and primary provider for this patient AND AM THE PRIMARY PROVIDER OF RECORD. I reviewed the vital signs, available nursing notes, past medical history, past surgical history, family history and social history. - Initial assessment performed. The patients presenting problems have been discussed, and the staff are in agreement with the care plan formulated and outlined with them. I have encouraged them to ask questions as they arise throughout their visit. Table 1  Composition of the HEART score for chest pain patients in the emergency room. HEART score for chest pain patients     Score  History   Highly suspicious    2     Moderately suspicious   1     Slightly suspicious    0  ECG   Significant ST depression   2     Nonspecific repolarisation disturbance 1     Normal      0  Age   =65 year     2     45-65 year     1     <45 year     0  Risk factors  =3 risk factors or history of cad  2     1 or 2 risk factors    1     No risk factors known    0  Troponin  >2x normal limit    2     1-2x normal limit    1     =normal limit     0  Total      CRITICAL ACTIONS    0-3: 0.9-1.7% risk of adverse cardiac event. In the HEART Score, these patients were  discharged.  (0.99% retrospective) (1.7% prospective)  4-6: 12-16.6% risk of adverse cardiac event. In the HEART Score, these patients were  admitted to the hospital. (11.6% retrospective) (16.6% prospective)  =7: 50-65% risk of adverse cardiac event. In the HEART Score, these patients were  candidates for early invasive measures. (65.2% retrospective) (50.1%  prospective)  MACE (Major Adverse Cardiac Events) is defined as: all-cause mortality, myocardial infarction, or coronary revascularization    ED Heart Score  History: Moderately Suspicious  ECG: Nonspecific repolarization disturbance  Age: Greater than 45 years - Less than 65 years  Risk Factors: 1 or 2 risk factors  Troponin: Less than or equal to normal limit  HEART Score Total : 4    MDM  The differential diagnosis is ACS, pneumothorax, anxiety, pneumonia, esophageal spasm. Work-up is unremarkable however patient continues to have chest pain. She does have a heart score 4. She is given aspirin by EMS. Here she was given a GI cocktail with viscous lidocaine and Maalox with no improvement. I then gave her nitroglycerin sublingual which did help with her symptoms. Given risk of ACS will admit for observation and cardiology consultation. Vital Signs-Reviewed the patient's vital signs. Patient Vitals for the past 12 hrs:   Temp Pulse Resp BP SpO2   07/12/22 0840 98.2 °F (36.8 °C) 93 18 126/84 99 %       EKG interpretation: (Preliminary): EKG Interpreted by ED physician. Obtained on 7/12/2022 at [address] [facility]. Read at [address] Normal sinus rhythm at rate of 85 bpm.  Normal OK interval, QRS duration, QTc interval.  No ST segment abnormalities. Normal axis. Disposition   Disposition:     Admitted    DISCHARGE PLAN:  1. Current Discharge Medication List      CONTINUE these medications which have NOT CHANGED    Details   acetaminophen (TylenoL) 325 mg tablet Take 650 mg by mouth every four (4) hours as needed for Pain. Cetirizine (ZyrTEC) 10 mg cap Take  by mouth.       diclofenac EC (VOLTAREN) 75 mg EC tablet TAKE ONE TABLET BY MOUTH TWICE DAILY  Qty: 60 Tab, Refills: 0      fluticasone (FLONASE) 50 mcg/actuation nasal spray 2 Sprays by Both Nostrils route daily. Qty: 1 Bottle, Refills: 1      DULoxetine (CYMBALTA) 60 mg capsule Take 2 Caps by mouth daily (with breakfast). Qty: 60 Cap, Refills: 1    Associated Diagnoses: Depression with anxiety      azelastine (ASTELIN) 137 mcg (0.1 %) nasal spray 1 [place] by Both Nostrils route two (2) times a day. Use in each nostril as directed  Qty: 1 Bottle, Refills: 3    Associated Diagnoses: Viral URI with cough      traMADol (ULTRAM) 50 mg tablet Take 1 Tab by mouth every eight (8) hours as needed for Pain. Max Daily Amount: 150 mg.  Qty: 45 Tab, Refills: 0    Associated Diagnoses: Chronic pain of left knee      levonorgestrel-ethinyl estradiol (ALTAVERA, 28,) 0.15-0.03 mg per tablet Take  by mouth daily. 2.   Follow-up Information    None       3. Return to ED if worse   4. Current Discharge Medication List         Diagnosis/Clinical Impression   Clinical Impression:   1. Chest pain, unspecified type           Attestations:  Minoo Aparicio, DO    Please note that this dictation was completed with [employer], the computer voice recognition software. Quite often unanticipated grammatical, syntax, homophones, and other interpretive errors are inadvertently transcribed by the computer software. Please disregard these errors. Please excuse any errors that have escaped final proofreading. Thank you.

## 2022-07-12 NOTE — CONSULTS
CONSULTATION    REASON FOR CONSULT:  Chest pain    REQUESTING PROVIDER:  Catherine Gupta NP    CHIEF COMPLAINT:    Chief Complaint   Patient presents with    Chest Pain         HISTORY OF PRESENT ILLNESS:  Abdirizak Krishnan is a 50y.o. year-old female with past medical history significant for sarcoidosis and arthritis who was admitted to the hospital for further evaluation of chest pain. Patient states her pain began last night and persisted into this morning. She went to work but the pain continued, prompting her to call EMS. Pain is described as constant, sharp, 8/10 pain that radiates into her chest and back. There are no relieving or exacerbating factors. She offers no other complaints. Patient was last seen on 22. Significant labs include: HS troponin negative x 2, EKG:     Records from hospital admission course thus far reviewed.       Telemetry Review: Remains in normal sinus rhythm      PAST MEDICAL HISTORY:    Past Medical History:   Diagnosis Date    Coagulation disorder ([address]     sickle cell trait- not disease    Ill-defined condition     Sarcoidosis    Sarcoidosis        PAST SURGICAL HISTORY:   Past Surgical History:   Procedure Laterality Date    HX  SECTION      HX GYN      tubal ligation    HX HEENT      wisdom teeth extracted    HX LAP CHOLECYSTECTOMY         ALLERGIES:    Allergies   Allergen Reactions    Bactrim [Sulfamethoprim Ds] Hives and Other (comments)     flushing       FAMILY HISTORY:    Family History   Problem Relation Age of Onset    Hypertension Mother    Bernestine Pedro Gout Mother     Hypertension Father     Diabetes Father     OSTEOARTHRITIS Father     Sickle Cell Trait Father     Sickle Cell Trait Sister     Sickle Cell Trait Brother     Sickle Cell Trait Brother     Sickle Cell Anemia Brother     No Known Problems Other     No Known Problems Son     No Known Problems Son     No Known Problems Daughter     No Known Problems Daughter     No Known Problems Daughter     No Known Problems Daughter        SOCIAL HISTORY:    Tobacco: Never smoker  Drugs: Not documented  ETOH: Not documented    HOME MEDICATIONS:    Prior to Admission Medications   Prescriptions Last Dose Informant Patient Reported? Taking? Cetirizine (ZyrTEC) 10 mg cap Not Taking at Unknown time  Yes No   Sig: Take  by mouth. Patient not taking: Reported on 2022   DULoxetine (CYMBALTA) 60 mg capsule Not Taking at Unknown time  No No   Sig: Take 2 Caps by mouth daily (with breakfast). Patient not taking: Reported on 2022   acetaminophen (TylenoL) 325 mg tablet Not Taking at Unknown time  Yes No   Sig: Take 650 mg by mouth every four (4) hours as needed for Pain. Patient not taking: Reported on 2022   azelastine (ASTELIN) 137 mcg (0.1 %) nasal spray Not Taking at Unknown time  No No   [place] by Both Nostrils route two (2) times a day. Use in each nostril as directed   Patient not taking: Reported on 2022   diclofenac EC (VOLTAREN) 75 mg EC tablet Not Taking at Unknown time  No No   Sig: TAKE ONE TABLET BY MOUTH TWICE DAILY   Patient not taking: Reported on 2022   fluticasone (FLONASE) 50 mcg/actuation nasal spray Not Taking at Unknown time  No No   Si Sprays by Both Nostrils route daily. Patient not taking: Reported on 2022   levonorgestrel-ethinyl estradiol (ALTAVERA, 28,) 0.15-0.03 mg per tablet Not Taking at Unknown time  Yes No   Sig: Take  by mouth daily. Patient not taking: Reported on 2022   traMADol (ULTRAM) 50 mg tablet Not Taking at Unknown time  No No   Sig: Take 1 Tab by mouth every eight (8) hours as needed for Pain. Max Daily Amount: 150 mg. Patient not taking: Reported on 2022      Facility-Administered Medications: None       REVIEW OF SYSTEMS:  Complete review of systems performed, pertinents noted above, all other systems are negative.     Patient Vitals for the past 24 hrs:   Temp Pulse Resp BP SpO2   22 1515 97.9 °F (36.6 °C) (!) 102 16 96/69 100 %   07/12/22 1358 -- 82 18 138/76 99 %   07/12/22 1140 -- 81 18 119/75 99 %   07/12/22 0840 98.2 °F (36.8 °C) 93 18 126/84 99 %       PHYSICAL EXAMINATION:    General: NAD, A&O  HEENT: Normocephalic, PERRL, no drainage  Neck: Supple, Trachea midline, No JVD  RESP: CTA bilaterally. + Symmetrical chest movement. On RA  Cardiovascular: RRR no MRG  PVS: No rubor, cyanosis,no edema  ABD: soft, NT, Normoactive BS  Derm: Warm/Dry/Intact with no lesions  Neuro: A&O PPTS, No focal deficits  PSYCH: No anxiety or agitation    Recent labs results and imaging reviewed. Recent Results (from the past 24 hour(s))   EKG, 12 LEAD, INITIAL    Collection Time: 07/12/22  8:40 AM   Result Value Ref Range    Ventricular Rate 85 BPM    Atrial Rate 85 BPM    P-R Interval 178 ms    QRS Duration 74 ms    Q-T Interval 368 ms    QTC Calculation (Bezet) 437 ms    Calculated P Axis 61 degrees    Calculated R Axis 46 degrees    Calculated T Axis 42 degrees    Diagnosis       Normal sinus rhythm  Normal ECG  No previous ECGs available  Confirmed by Jocy Jaime M.D., Albertina Sosa ([id number]) on 7/12/2022 3:54:03 PM     CBC WITH AUTOMATED DIFF    Collection Time: 07/12/22  8:41 AM   Result Value Ref Range    WBC 5.4 3.6 - 11.0 K/uL    RBC 4.45 3.80 - 5.20 M/uL    HGB 13.1 11.5 - 16.0 g/dL    HCT 39.3 35.0 - 47.0 %    MCV 88.3 80.0 - 99.0 FL    MCH 29.4 26.0 - 34.0 PG    MCHC 33.3 30.0 - 36.5 g/dL    RDW 13.3 11.5 - 14.5 %    PLATELET 317 678 - 385 K/uL    MPV 11.0 8.9 - 12.9 FL    NEUTROPHILS 54 32 - 75 %    LYMPHOCYTES 32 12 - 49 %    MONOCYTES 10 5 - 13 %    EOSINOPHILS 2 0 - 7 %    BASOPHILS 1 0 - 1 %    IMMATURE GRANULOCYTES 1 (H) 0.0 - 0.5 %    ABS. NEUTROPHILS 3.0 1.8 - 8.0 K/UL    ABS. LYMPHOCYTES 1.8 0.8 - 3.5 K/UL    ABS. MONOCYTES 0.5 0.0 - 1.0 K/UL    ABS. EOSINOPHILS 0.1 0.0 - 0.4 K/UL    ABS. BASOPHILS 0.0 0.0 - 0.1 K/UL    ABS. IMM.  GRANS. 0.0 0.00 - 0.04 K/UL    DF AUTOMATED     METABOLIC PANEL, BASIC    Collection Time: 07/12/22  8:41 AM   Result Value Ref Range    Sodium 141 136 - 145 mmol/L    Potassium 3.8 3.5 - 5.1 mmol/L    Chloride 103 97 - 108 mmol/L    CO2 30 21 - 32 mmol/L    Anion gap 8 5 - 15 mmol/L    Glucose 99 65 - 100 mg/dL    BUN 15 6 - 20 mg/dL    Creatinine 0.95 0.55 - 1.02 mg/dL    BUN/Creatinine ratio 16 12 - 20      GFR est AA >60 >60 ml/min/1.73m2    GFR est non-AA >60 >60 ml/min/1.73m2    Calcium 9.4 8.5 - 10.1 mg/dL   TROPONIN-HIGH SENSITIVITY    Collection Time: 07/12/22  8:41 AM   Result Value Ref Range    Troponin-High Sensitivity 22 0 - 51 ng/L   TROPONIN-HIGH SENSITIVITY    Collection Time: 07/12/22 10:37 AM   Result Value Ref Range    Troponin-High Sensitivity 21 0 - 51 ng/L       XR Results (maximum last 3): Results from [place] encounter on 07/12/22    XR CHEST SNGL V    Impression  No acute cardiopulmonary abnormality. Results from [place] encounter on 08/11/15    XR SPINE LUMB 2 OR 3 V    Impression  : Negative. Signing/Reading Doctor: Chaka Maddox ([id number])  Approved: Chaka Maddox ([id number])  Aug 11 2015  3:32AM      CT Results (maximum last 3): No results found for this or any previous visit. MRI Results (maximum last 3): Results from [place] encounter on 10/20/15    MRI KNEE LT WO CONT    Impression  :  1. No meniscal tear demonstrated. 2. 9 x 5 mm grade 3 chondral defect of medial femoral condyle. 3. Increased TT-TG distance. Grade 2-3 chondral derangement of medial  patellar facet, predominating at upper pole. Signing/Reading Doctor: Arsenio Ozuna  ([id number])  Approved: JOSE Ozuna  ([id number])  Oct 21 2015  9:47AM      Nuclear Medicine Results (maximum last 3): No results found for this or any previous visit. US Results (maximum last 3): Results from Hospital Encounter encounter on 07/02/15    US TRANSVAGINAL    Impression  :  Heterogeneous uterus suggesting possible adenomyosis.     Possible 1.4 cm uterine fibroid. Signing/Reading Doctor: Namrata Diaz ([id number])  Approved: Namrata Diaz ([id number])  Jul 2 2015 12:19PM      US PELV NON OBS    Impression  :  Heterogeneous uterus suggesting possible adenomyosis. Possible 1.4 cm uterine fibroid. Signing/Reading Doctor: Namrata Diaz ([id number])  Approved: Namrata Diaz ([id number])  Jul 2 2015 12:19PM          Current Facility-Administered Medications:     0.9% sodium chloride infusion, 75 mL/hr, IntraVENous, CONTINUOUS, Lloyd Antonio,     sodium chloride (NS) flush 5-40 mL, 5-40 mL, IntraVENous, Q8H, Deanne Ramires, HONEY    sodium chloride (NS) flush 5-40 mL, 5-40 mL, IntraVENous, PRN, Deanne Ramires, NP    acetaminophen (TYLENOL) tablet 650 mg, 650 mg, Oral, Q6H PRN **OR** acetaminophen (TYLENOL) suppository 650 mg, 650 mg, Rectal, Q6H PRN, Deanne Ramires NP    polyethylene glycol (MIRALAX) packet 17 g, 17 g, Oral, DAILY PRN, Deanne Ramires NP    ondansetron (ZOFRAN ODT) tablet 4 mg, 4 mg, Oral, Q8H PRN **OR** ondansetron (ZOFRAN) injection 4 mg, 4 mg, IntraVENous, Q6H PRN, Deanne Ramires, NP    morphine injection 1 mg, 1 mg, IntraVENous, Q4H PRN, Deanne Ramires NP    diazePAM (VALIUM) tablet 5 mg, 5 mg, Oral, Q6H PRN, Deanne Ramires NP      Case discussed with collaborating physician Dr. Sebastián Patel and our impression and recommendations are as follows:     1. Atypical chest pain:   - 8/10 sharp, radiating chest pain for the past 12 hours. - Troponins are negative x 2.   - EKGs  Nonischemic. No ST elevation or U waves, MS depression noted. Will order CRP and repeat EKG in the AM. CTA chest pending to rule out non-cardiac causes. - OP echo on 3/22 showed EF of 65-70%. Will repeat limited echo. - continue morphine for chest pain and Zofran for nausea.   - 3/22 Treadmilll stress test normal  - discussed the risks and benefits of a cardiac catheterization with the patient and with her  and she has agreed to the procedure. Will obtain written consent. NPO at midnight. 2. Hypotension:   - blood pressure below goal 90/60  - c/o dizziness and palpitaitons  - will give fluid bolus    Thank you for involving us in the care of this patient. Please do not hesitate to call if additional questions arise. If after hours please call [phone]. Dr. Shari Stanton Cardiology  [address], [address] [facility]  [phone] Render Risk Assessment In Note?: no Note Text (......Xxx Chief Complaint.): This diagnosis correlates with the Other (Free Text): no suspicious lesions noted on skin examination Detail Level: Simple

## 2022-07-12 NOTE — Clinical Note
Contrast Dose Calculator:   Patient's age: 50.   Patient's sex: Female. Patient weight (kg) = 110.2. Creatinine level (mg/dL) = 0.95. Creatinine clearance (mL/min): 123. Contrast concentration (mg/mL) = 370. MACD = 300 mL. Max Contrast dose per Creatinine Cl calculator = 276.75 mL.

## 2022-07-13 ENCOUNTER — APPOINTMENT (OUTPATIENT)
Dept: NON INVASIVE DIAGNOSTICS | Age: 52
End: 2022-07-13
Attending: NURSE PRACTITIONER
Payer: COMMERCIAL

## 2022-07-13 LAB
CRP SERPL HS-MCNC: >9.5 MG/L
ECHO AO ASC DIAM: 2.6 CM
ECHO AO ASCENDING AORTA INDEX: 1.24 CM/M2
ECHO AO ROOT DIAM: 3.1 CM
ECHO AO ROOT INDEX: 1.48 CM/M2
ECHO AV AREA PEAK VELOCITY: 3.6 CM2
ECHO AV AREA VTI: 3.5 CM2
ECHO AV AREA/BSA PEAK VELOCITY: 1.7 CM2/M2
ECHO AV AREA/BSA VTI: 1.7 CM2/M2
ECHO AV MEAN GRADIENT: 4 MMHG
ECHO AV MEAN VELOCITY: 0.9 M/S
ECHO AV PEAK GRADIENT: 7 MMHG
ECHO AV PEAK VELOCITY: 1.3 M/S
ECHO AV VELOCITY RATIO: 1.08
ECHO AV VTI: 24.7 CM
ECHO EST RA PRESSURE: 3 MMHG
ECHO LA AREA 2C: 14.2 CM2
ECHO LA AREA 4C: 11 CM2
ECHO LA DIAMETER INDEX: 1.62 CM/M2
ECHO LA DIAMETER: 3.4 CM
ECHO LA MAJOR AXIS: 4.2 CM
ECHO LA MINOR AXIS: 4.2 CM
ECHO LA TO AORTIC ROOT RATIO: 1.1
ECHO LA VOL 4C: 23 ML (ref 22–52)
ECHO LA VOL BP: 29 ML (ref 22–52)
ECHO LA VOL/BSA BIPLANE: 14 ML/M2 (ref 16–34)
ECHO LA VOLUME INDEX A4C: 11 ML/M2 (ref 16–34)
ECHO LV E' LATERAL VELOCITY: 14 CM/S
ECHO LV E' SEPTAL VELOCITY: 9 CM/S
ECHO LV EDV A2C: 47 ML
ECHO LV EDV A4C: 42 ML
ECHO LV EDV INDEX A4C: 20 ML/M2
ECHO LV EDV NDEX A2C: 22 ML/M2
ECHO LV EJECTION FRACTION A2C: 66 %
ECHO LV EJECTION FRACTION A4C: 47 %
ECHO LV EJECTION FRACTION BIPLANE: 54 % (ref 55–100)
ECHO LV ESV A2C: 16 ML
ECHO LV ESV A4C: 23 ML
ECHO LV ESV INDEX A2C: 8 ML/M2
ECHO LV ESV INDEX A4C: 11 ML/M2
ECHO LV FRACTIONAL SHORTENING: 33 % (ref 28–44)
ECHO LV INTERNAL DIMENSION DIASTOLE INDEX: 1.57 CM/M2
ECHO LV INTERNAL DIMENSION DIASTOLIC: 3.3 CM (ref 3.9–5.3)
ECHO LV INTERNAL DIMENSION SYSTOLIC INDEX: 1.05 CM/M2
ECHO LV INTERNAL DIMENSION SYSTOLIC: 2.2 CM
ECHO LV IVSD: 0.9 CM (ref 0.6–0.9)
ECHO LV MASS 2D: 94.6 G (ref 67–162)
ECHO LV MASS INDEX 2D: 45 G/M2 (ref 43–95)
ECHO LV POSTERIOR WALL DIASTOLIC: 1.1 CM (ref 0.6–0.9)
ECHO LV RELATIVE WALL THICKNESS RATIO: 0.67
ECHO LVOT AREA: 3.5 CM2
ECHO LVOT AV VTI INDEX: 1
ECHO LVOT DIAM: 2.1 CM
ECHO LVOT MEAN GRADIENT: 4 MMHG
ECHO LVOT PEAK GRADIENT: 8 MMHG
ECHO LVOT PEAK VELOCITY: 1.4 M/S
ECHO LVOT STROKE VOLUME INDEX: 40.7 ML/M2
ECHO LVOT SV: 85.5 ML
ECHO LVOT VTI: 24.7 CM
ECHO MV A VELOCITY: 0.63 M/S
ECHO MV E DECELERATION TIME (DT): 560 MS
ECHO MV E VELOCITY: 0.99 M/S
ECHO MV E/A RATIO: 1.57
ECHO MV E/E' LATERAL: 7.07
ECHO MV E/E' RATIO (AVERAGED): 9.04
ECHO MV E/E' SEPTAL: 11
ECHO MV REGURGITANT PEAK GRADIENT: 3 MMHG
ECHO MV REGURGITANT PEAK VELOCITY: 0.9 M/S
ECHO PULMONARY ARTERY SYSTOLIC PRESSURE (PASP): 34 MMHG
ECHO PV MAX VELOCITY: 0.9 M/S
ECHO PV PEAK GRADIENT: 3 MMHG
ECHO RA AREA 4C: 10.9 CM2
ECHO RA END SYSTOLIC VOLUME APICAL 4 CHAMBER INDEX BSA: 13 ML/M2
ECHO RA VOLUME: 27 ML
ECHO RIGHT VENTRICULAR SYSTOLIC PRESSURE (RVSP): 15 MMHG
ECHO RV BASAL DIMENSION: 3.3 CM
ECHO RV FREE WALL PEAK S': 16 CM/S
ECHO RV MID DIMENSION: 2.5 CM
ECHO TV REGURGITANT MAX VELOCITY: 1.74 M/S
ECHO TV REGURGITANT PEAK GRADIENT: 12 MMHG
LEFT ARM BP: 120 MMHG
LEFT CCA DIST DIAS: 33.2 CM/S
LEFT CCA DIST SYS: 92.3 CM/S
LEFT CCA PROX DIAS: 26.5 CM/S
LEFT CCA PROX SYS: 88.7 CM/S
LEFT ECA DIAS: 19.9 CM/S
LEFT ECA SYS: 95.9 CM/S
LEFT ICA DIST DIAS: 51.9 CM/S
LEFT ICA DIST SYS: 122 CM/S
LEFT ICA PROX DIAS: 33.8 CM/S
LEFT ICA PROX SYS: 96.5 CM/S
LEFT ICA/CCA SYS: 1.05
LEFT VERTEBRAL DIAS: 22.9 CM/S
LEFT VERTEBRAL SYS: 68 CM/S
RIGHT ARM BP: 116 MMHG
RIGHT CCA DIST DIAS: 29.8 CM/S
RIGHT CCA DIST SYS: 87.8 CM/S
RIGHT CCA PROX DIAS: 40.4 CM/S
RIGHT CCA PROX SYS: 147 CM/S
RIGHT ECA DIAS: 22.9 CM/S
RIGHT ECA SYS: 108 CM/S
RIGHT ICA DIST DIAS: 47.4 CM/S
RIGHT ICA DIST SYS: 134 CM/S
RIGHT ICA PROX DIAS: 33.6 CM/S
RIGHT ICA PROX SYS: 85.5 CM/S
RIGHT ICA/CCA SYS: 0.97
RIGHT VERTEBRAL DIAS: 6.07 CM/S
RIGHT VERTEBRAL SYS: 26.5 CM/S
VAS LEFT SUBCLAVIAN PROX EDV: 0 CM/S
VAS LEFT SUBCLAVIAN PROX PSV: 88.7 CM/S
VAS RIGHT SUBCLAVIAN PROX EDV: 0 CM/S
VAS RIGHT SUBCLAVIAN PROX PSV: 116 CM/S

## 2022-07-13 PROCEDURE — G0378 HOSPITAL OBSERVATION PER HR: HCPCS

## 2022-07-13 PROCEDURE — 93880 EXTRACRANIAL BILAT STUDY: CPT

## 2022-07-13 PROCEDURE — 99152 MOD SED SAME PHYS/QHP 5/>YRS: CPT | Performed by: STUDENT IN AN ORGANIZED HEALTH CARE EDUCATION/TRAINING PROGRAM

## 2022-07-13 PROCEDURE — 93005 ELECTROCARDIOGRAM TRACING: CPT

## 2022-07-13 PROCEDURE — 96376 TX/PRO/DX INJ SAME DRUG ADON: CPT

## 2022-07-13 PROCEDURE — 74011250637 HC RX REV CODE- 250/637: Performed by: STUDENT IN AN ORGANIZED HEALTH CARE EDUCATION/TRAINING PROGRAM

## 2022-07-13 PROCEDURE — 76937 US GUIDE VASCULAR ACCESS: CPT | Performed by: STUDENT IN AN ORGANIZED HEALTH CARE EDUCATION/TRAINING PROGRAM

## 2022-07-13 PROCEDURE — 74011250637 HC RX REV CODE- 250/637: Performed by: NURSE PRACTITIONER

## 2022-07-13 PROCEDURE — 74011000250 HC RX REV CODE- 250: Performed by: NURSE PRACTITIONER

## 2022-07-13 PROCEDURE — 76210000006 HC OR PH I REC 0.5 TO 1 HR: Performed by: STUDENT IN AN ORGANIZED HEALTH CARE EDUCATION/TRAINING PROGRAM

## 2022-07-13 PROCEDURE — C9113 INJ PANTOPRAZOLE SODIUM, VIA: HCPCS | Performed by: NURSE PRACTITIONER

## 2022-07-13 PROCEDURE — 74011250636 HC RX REV CODE- 250/636: Performed by: NURSE PRACTITIONER

## 2022-07-13 PROCEDURE — 93458 L HRT ARTERY/VENTRICLE ANGIO: CPT | Performed by: STUDENT IN AN ORGANIZED HEALTH CARE EDUCATION/TRAINING PROGRAM

## 2022-07-13 PROCEDURE — 74011000636 HC RX REV CODE- 636: Performed by: STUDENT IN AN ORGANIZED HEALTH CARE EDUCATION/TRAINING PROGRAM

## 2022-07-13 PROCEDURE — 93306 TTE W/DOPPLER COMPLETE: CPT

## 2022-07-13 PROCEDURE — C1769 GUIDE WIRE: HCPCS | Performed by: STUDENT IN AN ORGANIZED HEALTH CARE EDUCATION/TRAINING PROGRAM

## 2022-07-13 PROCEDURE — 74011250636 HC RX REV CODE- 250/636: Performed by: STUDENT IN AN ORGANIZED HEALTH CARE EDUCATION/TRAINING PROGRAM

## 2022-07-13 PROCEDURE — 77030019698 HC SYR ANGI MDLON MRTM -A: Performed by: STUDENT IN AN ORGANIZED HEALTH CARE EDUCATION/TRAINING PROGRAM

## 2022-07-13 PROCEDURE — 74011000250 HC RX REV CODE- 250: Performed by: STUDENT IN AN ORGANIZED HEALTH CARE EDUCATION/TRAINING PROGRAM

## 2022-07-13 PROCEDURE — 77030040934 HC CATH DIAG DXTERITY MEDT -A: Performed by: STUDENT IN AN ORGANIZED HEALTH CARE EDUCATION/TRAINING PROGRAM

## 2022-07-13 RX ORDER — NITROGLYCERIN 5 MG/ML
INJECTION, SOLUTION INTRAVENOUS AS NEEDED
Status: DISCONTINUED | OUTPATIENT
Start: 2022-07-13 | End: 2022-07-13 | Stop reason: HOSPADM

## 2022-07-13 RX ORDER — LIDOCAINE HYDROCHLORIDE 10 MG/ML
INJECTION INFILTRATION; PERINEURAL AS NEEDED
Status: DISCONTINUED | OUTPATIENT
Start: 2022-07-13 | End: 2022-07-13 | Stop reason: HOSPADM

## 2022-07-13 RX ORDER — SODIUM CHLORIDE 0.9 % (FLUSH) 0.9 %
5-40 SYRINGE (ML) INJECTION AS NEEDED
Status: DISCONTINUED | OUTPATIENT
Start: 2022-07-13 | End: 2022-07-14 | Stop reason: HOSPADM

## 2022-07-13 RX ORDER — VERAPAMIL HYDROCHLORIDE 2.5 MG/ML
INJECTION, SOLUTION INTRAVENOUS AS NEEDED
Status: DISCONTINUED | OUTPATIENT
Start: 2022-07-13 | End: 2022-07-13 | Stop reason: HOSPADM

## 2022-07-13 RX ORDER — SODIUM CHLORIDE 0.9 % (FLUSH) 0.9 %
5-40 SYRINGE (ML) INJECTION EVERY 8 HOURS
Status: DISCONTINUED | OUTPATIENT
Start: 2022-07-13 | End: 2022-07-14 | Stop reason: HOSPADM

## 2022-07-13 RX ORDER — HEPARIN SODIUM 1000 [USP'U]/ML
INJECTION, SOLUTION INTRAVENOUS; SUBCUTANEOUS AS NEEDED
Status: DISCONTINUED | OUTPATIENT
Start: 2022-07-13 | End: 2022-07-13 | Stop reason: HOSPADM

## 2022-07-13 RX ORDER — MIDAZOLAM HYDROCHLORIDE 1 MG/ML
INJECTION INTRAMUSCULAR; INTRAVENOUS AS NEEDED
Status: DISCONTINUED | OUTPATIENT
Start: 2022-07-13 | End: 2022-07-13 | Stop reason: HOSPADM

## 2022-07-13 RX ORDER — HEPARIN SODIUM 200 [USP'U]/100ML
INJECTION, SOLUTION INTRAVENOUS
Status: COMPLETED | OUTPATIENT
Start: 2022-07-13 | End: 2022-07-13

## 2022-07-13 RX ORDER — FENTANYL CITRATE 50 UG/ML
INJECTION, SOLUTION INTRAMUSCULAR; INTRAVENOUS AS NEEDED
Status: DISCONTINUED | OUTPATIENT
Start: 2022-07-13 | End: 2022-07-13 | Stop reason: HOSPADM

## 2022-07-13 RX ORDER — IBUPROFEN 400 MG/1
400 TABLET ORAL 3 TIMES DAILY
Status: DISCONTINUED | OUTPATIENT
Start: 2022-07-13 | End: 2022-07-14 | Stop reason: HOSPADM

## 2022-07-13 RX ADMIN — MORPHINE SULFATE 1 MG: 2 INJECTION, SOLUTION INTRAMUSCULAR; INTRAVENOUS at 21:17

## 2022-07-13 RX ADMIN — SODIUM CHLORIDE, PRESERVATIVE FREE 10 ML: 5 INJECTION INTRAVENOUS at 06:18

## 2022-07-13 RX ADMIN — IBUPROFEN 400 MG: 400 TABLET ORAL at 19:42

## 2022-07-13 RX ADMIN — SODIUM CHLORIDE, PRESERVATIVE FREE 10 ML: 5 INJECTION INTRAVENOUS at 22:00

## 2022-07-13 RX ADMIN — COLCHICINE 0.6 MG: 0.6 TABLET, FILM COATED ORAL at 09:03

## 2022-07-13 RX ADMIN — SODIUM CHLORIDE, PRESERVATIVE FREE 40 MG: 5 INJECTION INTRAVENOUS at 20:54

## 2022-07-13 RX ADMIN — SODIUM CHLORIDE, PRESERVATIVE FREE 40 MG: 5 INJECTION INTRAVENOUS at 09:03

## 2022-07-13 RX ADMIN — DIAZEPAM 5 MG: 5 TABLET ORAL at 10:35

## 2022-07-13 RX ADMIN — SODIUM CHLORIDE, PRESERVATIVE FREE 10 ML: 5 INJECTION INTRAVENOUS at 21:04

## 2022-07-13 RX ADMIN — DIAZEPAM 5 MG: 5 TABLET ORAL at 20:54

## 2022-07-13 NOTE — PROGRESS NOTES
Hospitalist Progress Note         PAULA Bergeron, FNP-C    7/13/2022      RE: Tolu Pierre      To Whom it May Concern: This is to certify that Tolu Pierre may return to work on August 1, 2022      Please feel free to contact my office if you have any questions or concerns. Thank you for your assistance in this matter.     Sincerely,      Ofelia Perez NP  [phone]

## 2022-07-13 NOTE — PROGRESS NOTES
SON)/ [place] [facility]patient Observation Notice (Gwen Emmanuel) provided to patient/representative with verbal explanation of the notice. Time allotted for questions regarding the notice. Patient /representative provided a completed copy of the SON/VOON notice. Copy placed on bedside chart.

## 2022-07-13 NOTE — PROGRESS NOTES
Hospitalist Progress Note         PAULA Everett, FNP-C    Daily Progress Note: 7/13/2022      Subjective:   Subjective   Patient examined alert and oriented lying in bed. Reports continued chest pain that is improved. No acute distress noted on examination. No overnight events reported    Review of Systems:   Review of Systems   Constitutional: Negative for chills and fever. HENT: Negative for hearing loss and sore throat. Respiratory: Negative for cough, shortness of breath and stridor. Cardiovascular: Positive for chest pain and leg swelling. Gastrointestinal: Negative for abdominal pain, diarrhea, heartburn and vomiting. Genitourinary: Negative for dysuria. Musculoskeletal: Negative for myalgias and neck pain. Skin: Negative for itching and rash. Neurological: Negative for dizziness and weakness. Objective:   Objective      Vitals:  Patient Vitals for the past 12 hrs:   Temp Pulse Resp BP SpO2   07/13/22 0747 97.3 °F (36.3 °C) 81 18 97/64 98 %   07/13/22 0425 -- 78 16 98/61 98 %        Physical Exam:  Physical Exam  Vitals and nursing note reviewed. Constitutional:       Appearance: She is obese. Eyes:      Extraocular Movements: Extraocular movements intact. Cardiovascular:      Rate and Rhythm: Normal rate. Heart sounds: Normal heart sounds. Pulmonary:      Breath sounds: Normal breath sounds. Abdominal:      Palpations: Abdomen is soft. Musculoskeletal:         General: Normal range of motion. Skin:     General: Skin is warm and dry. Capillary Refill: Capillary refill takes less than 2 seconds. Neurological:      Mental Status: She is alert and oriented to person, place, and time.    Psychiatric:         Mood and Affect: Mood normal.         Behavior: Behavior normal.          Lab Results:  Recent Results (from the past 24 hour(s))   D DIMER    Collection Time: 07/12/22  6:37 PM   Result Value Ref Range    D DIMER 0.40 <0.50 ug/ml(FEU)   CRP, HIGH SENSITIVITY    Collection Time: 07/12/22  6:37 PM   Result Value Ref Range    CRP, High sensitivity >9.5 mg/L   DUPLEX CAROTID BILATERAL    Collection Time: 07/13/22 11:35 AM   Result Value Ref Range    Left CCA dist sys 92.3 cm/s    Left CCA dist negron 33.2 cm/s    Left CCA prox sys 88.7 cm/s    Left CCA prox negron 26.5 cm/s    Left ICA dist sys 122.0 cm/s    Left ICA dist negron 51.9 cm/s    Left ICA prox sys 96.5 cm/s    Left ICA prox negron 33.8 cm/s    Left ECA sys 95.9 cm/s    LEFT EXTERNAL CAROTID ARTERY D 19.90 cm/s    Left subclavian prox PSV 88.7 cm/s    Left subclavian prox EDV 0.0 cm/s    Left vertebral sys 68.0 cm/s    LEFT VERTEBRAL ARTERY D 22.90 cm/s    Right cca dist sys 87.8 cm/s    Right CCA dist negron 29.8 cm/s    Right CCA prox sys 147.0 cm/s    Right CCA prox negron 40.4 cm/s    Right ICA dist sys 134.0 cm/s    Right ICA dist negron 47.4 cm/s    Right ICA prox sys 85.5 cm/s    Right ICA prox negron 33.6 cm/s    Right eca sys 108.0 cm/s    RIGHT EXTERNAL CAROTID ARTERY D 22.90 cm/s    Right subclavian prox .0 cm/s    Right subclavian prox EDV 0.0 cm/s    Right vertebral sys 26.5 cm/s    RIGHT VERTEBRAL ARTERY D 6.07 cm/s    Left arm  mmHg    Right arm  mmHg    Right ICA/CCA sys 0.97     Left ICA/CCA sys 1.05           Diagnostic Images:  CT Results  (Last 48 hours)               07/12/22 1822  CTA CHEST W OR W WO CONT Final result    Impression:  1. No pulmonary emboli. 2. No acute finding. Narrative:  INDICATION:  Chest pain,  sob        EXAM: CT Angio Chest:       TECHNIQUE: Unenhanced localizing CT imaging of the pulmonary arteries is   followed by bolus injection of 100 mL Isovue 370 contrast IV, with thin section   axial Chest CT obtained and 3D image post processing performed including coronal   MIPS. CT dose reduction was achieved through use of a standardized protocol   tailored for this examination and automatic exposure control for dose   modulation.        FINDINGS: There is no pulmonary embolism. There is no apparent right heart strain. There is no aortic dissection or aneurysm. Lungs are clear other than minor bibasilar dependent atelectasis. There is no   pneumothorax. There is no pleural effusion or significant pericardial fluid. There is no significant adenopathy. Visualized lower neck soft tissues are unremarkable. Current Medications:    Current Facility-Administered Medications:     0.9% sodium chloride infusion, 75 mL/hr, IntraVENous, CONTINUOUS, Lloyd Antonio DO    sodium chloride (NS) flush 5-40 mL, 5-40 mL, IntraVENous, Q8H, Elizabeth Hines NP, 10 mL at 07/13/22 0618    sodium chloride (NS) flush 5-40 mL, 5-40 mL, IntraVENous, PRN, Derick Riddle NP    acetaminophen (TYLENOL) tablet 650 mg, 650 mg, Oral, Q6H PRN **OR** acetaminophen (TYLENOL) suppository 650 mg, 650 mg, Rectal, Q6H PRN, Derick Riddle NP    polyethylene glycol (MIRALAX) packet 17 g, 17 g, Oral, DAILY PRN, Derick Riddle NP    ondansetron (ZOFRAN ODT) tablet 4 mg, 4 mg, Oral, Q8H PRN **OR** ondansetron (ZOFRAN) injection 4 mg, 4 mg, IntraVENous, Q6H PRN, Derick Riddle NP, 4 mg at 07/12/22 1650    morphine injection 1 mg, 1 mg, IntraVENous, Q4H PRN, Derick Riddle NP, 1 mg at 07/12/22 2044    diazePAM (VALIUM) tablet 5 mg, 5 mg, Oral, Q6H PRN, Derick Riddle NP, 5 mg at 07/13/22 1035    pantoprazole (PROTONIX) 40 mg in 0.9% sodium chloride 10 mL injection, 40 mg, IntraVENous, Q12H, Elizabteh Hines NP, 40 mg at 07/13/22 5093    colchicine tablet 0.6 mg, 0.6 mg, Oral, DAILY, Geronimo Wilson NP, 0.6 mg at 07/13/22 0799       ASSESSMENT:  William Clement is a 50 y.o. female who presents via EMS for cc of chest pain, onset last night. She reports driving to work this morning pain continued to worsen, accompanied with sob and dizziness. she subsequently pulled her car over and call 911. She reports feeling palpitations and nausea.  Pain characterized as tightness, non radiating. Pain is non reproducible. PM significant for sarcoidosis, djd, borderline hypertension. She reports covid around January 2022 and has continued to decline in physical health every since. Reports having a stress test with [id number] [place] cardi[facility] April 2022.      In the ED troponin x 2 negative. CXR negative for acute pulmonary findings. Routine labwork unremarkable. Hospitalist team consulted for further evaluation and treatment. On examination she continues to report chest pain, mid sternal non radiating. Not improved with medication or rest, Non reproducible. Will admit to observation status. Consult cardiology.      Patient elects full code. [address] [phone]. CTA chest negative for PE. 1. Atypical Chest pain  - Troponins are negative x 2.   - EKGs  Nonischemic. No ST elevation or U waves, AR depression noted. - OP echo on 3/22 showed EF of 65-70%. - Repeat echo pending   - 3/22 Treadmilll stress test normal  - Morphine prn for pain  -Cardiac cath today at 430    2. Nausea  -zofran prn     3. Dizziness  - suspect anxiety related  - carotid duplex pending    4. Shortness of breath  - maintaining sats on RA  - suspect secondary to anxiety  - prn valium for anxiety        Full Code  Dvt Prophylaxis scd's  GI Prophylaxis protonix  Disposition barriers:  -cardiac cath today  -echo pending   -cta neck pending  ~24 hrs      Above treatment plan reviewed and discussed with patient in detail at bedside, all questions answered. Care Plan discussed with: Interdisciplinary team    Total time spent with patient: 35 minutes.     Noelle Valdez NP

## 2022-07-13 NOTE — PROGRESS NOTES
Pt transported to 4W room 484, report given to RN assuming care. VS stable and pt denies pain upon departure from PACU. Reviewed TR band removal times with RN and identified hemostasis time.
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
## 2022-07-13 NOTE — PROGRESS NOTES
Reason for Admission:  Chest Pain                     RUR Score:     N/A                Plan for utilizing home health: Not at this time         PCP: First and Last name:  Yao Pickett MD     Name of Practice:    Are you a current patient: Yes/No:    Approximate date of last visit: April   Can you participate in a virtual visit with your PCP:                     Current Advanced Directive/Advance Care Plan: Full Code      Healthcare Decision Maker:   Click here to complete [address] including selection of the [address] Relationship (ie \"Primary\")           Dearradha Hubbard, daughter, [phone]                  Transition of Care Plan:      Met f/f with Pt, she confirmed that the information on the face sheet is correct. Pt stated she lives by [place] but that she has a good support system. Pt stated no HH, no DME and independent with  ADL. Pt stated that she uses [facility] at [address]. Pt stated that family will give her a ride home when she is D/C. CM dispo: home with no needs at this time.
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
## 2022-07-13 NOTE — PROGRESS NOTES
Cardiology Progress Note      CHIEF COMPLAINT:    Chief Complaint   Patient presents with    Chest Pain         HISTORY OF PRESENT ILLNESS:  Merced Mckeon is a 50y.o. year-old female with past medical history significant for sarcoidosis and arthritis who was admitted to the hospital for further evaluation of chest pain. Patient states her pain began last night and persisted into this morning. She went to work but the pain continued, prompting her to call EMS. Pain is described as constant, sharp, 8/10 pain that radiates into her chest and back. There are no relieving or exacerbating factors. She offers no other complaints. Patient was last seen on 22.    : Patient was seen and examined. Continues to feel unwell. Blood pressure remains on the low side of normal. Chest pain has improved with morphine. Records from hospital admission course thus far reviewed.       Telemetry Review: Remains in normal sinus rhythm, HR 80s      PAST MEDICAL HISTORY:    Past Medical History:   Diagnosis Date    Coagulation disorder ([facility] [address]     sickle cell trait- not disease    Ill-defined condition     Sarcoidosis    Sarcoidosis        PAST SURGICAL HISTORY:   Past Surgical History:   Procedure Laterality Date    HX  SECTION      HX GYN      tubal ligation    HX HEENT      wisdom teeth extracted    HX LAP CHOLECYSTECTOMY         ALLERGIES:    Allergies   Allergen Reactions    Bactrim [Sulfamethoprim Ds] Hives and Other (comments)     flushing       FAMILY HISTORY:    Family History   Problem Relation Age of Onset    Hypertension Mother    Jannell Outhouse Gout Mother     Hypertension Father     Diabetes Father     OSTEOARTHRITIS Father     Sickle Cell Trait Father     Sickle Cell Trait Sister     Sickle Cell Trait Brother     Sickle Cell Trait Brother     Sickle Cell Anemia Brother     No Known Problems Other     No Known Problems Son     No Known Problems Son     No Known Problems Daughter  No Known Problems Daughter     No Known Problems Daughter     No Known Problems Daughter        SOCIAL HISTORY:    Tobacco: Never smoker  Drugs: Not documented  ETOH: Not documented    HOME MEDICATIONS:    Prior to Admission Medications   Prescriptions Last Dose Informant Patient Reported? Taking? Cetirizine (ZyrTEC) 10 mg cap Not Taking at Unknown time  Yes No   Sig: Take  by mouth. Patient not taking: Reported on 2022   DULoxetine (CYMBALTA) 60 mg capsule Not Taking at Unknown time  No No   Sig: Take 2 Caps by mouth daily (with breakfast). Patient not taking: Reported on 2022   acetaminophen (TylenoL) 325 mg tablet Not Taking at Unknown time  Yes No   Sig: Take 650 mg by mouth every four (4) hours as needed for Pain. Patient not taking: Reported on 2022   azelastine (ASTELIN) 137 mcg (0.1 %) nasal spray Not Taking at Unknown time  No No   Si [place] by Both Nostrils route two (2) times a day. Use in each nostril as directed   Patient not taking: Reported on 2022   diclofenac EC (VOLTAREN) 75 mg EC tablet Not Taking at Unknown time  No No   Sig: TAKE ONE TABLET BY MOUTH TWICE DAILY   Patient not taking: Reported on 2022   fluticasone (FLONASE) 50 mcg/actuation nasal spray Not Taking at Unknown time  No No   Si Sprays by Both Nostrils route daily. Patient not taking: Reported on 2022   levonorgestrel-ethinyl estradiol (ALTAVERA, 28,) 0.15-0.03 mg per tablet Not Taking at Unknown time  Yes No   Sig: Take  by mouth daily. Patient not taking: Reported on 2022   traMADol (ULTRAM) 50 mg tablet Not Taking at Unknown time  No No   Sig: Take 1 Tab by mouth every eight (8) hours as needed for Pain. Max Daily Amount: 150 mg. Patient not taking: Reported on 2022      Facility-Administered Medications: None       REVIEW OF SYSTEMS:  Complete review of systems performed, pertinents noted above, all other systems are negative.     Patient Vitals for the past 24 hrs:   Temp Pulse Resp BP SpO2   07/13/22 0747 97.3 °F (36.3 °C) 81 18 97/64 98 %   07/13/22 0425 -- 78 16 98/61 98 %   07/12/22 2120 97.7 °F (36.5 °C) 85 16 109/72 90 %   07/12/22 1600 -- (!) 102 -- -- --   07/12/22 1515 97.9 °F (36.6 °C) (!) 102 16 96/69 100 %   07/12/22 1358 -- 82 18 138/76 99 %   07/12/22 1140 -- 81 18 119/75 99 %       PHYSICAL EXAMINATION:    General: NAD, A&O  HEENT: Normocephalic, PERRL, no drainage  Neck: Supple, Trachea midline, No JVD  RESP: CTA bilaterally. + Symmetrical chest movement. On RA  Cardiovascular: RRR no MRG  PVS: No rubor, cyanosis,no edema  ABD: soft, NT, Normoactive BS  Derm: Warm/Dry/Intact with no lesions  Neuro: A&O PPTS, No focal deficits  PSYCH: No anxiety or agitation    Recent labs results and imaging reviewed. Recent Results (from the past 24 hour(s))   D DIMER    Collection Time: 07/12/22  6:37 PM   Result Value Ref Range    D DIMER 0.40 <0.50 ug/ml(FEU)   CRP, HIGH SENSITIVITY    Collection Time: 07/12/22  6:37 PM   Result Value Ref Range    CRP, High sensitivity >9.5 mg/L       XR Results (maximum last 3): Results from [place] encounter on 07/12/22    XR CHEST SNGL V    Impression  No acute cardiopulmonary abnormality. Results from [place] encounter on 08/11/15    XR SPINE LUMB 2 OR 3 V    Impression  : Negative. Signing/Reading Doctor: Efrem Saeed ([id number])  Approved: Efrem Saeed ([id number])  Aug 11 2015  3:32AM      CT Results (maximum last 3): Results from [place] encounter on 07/12/22    CTA CHEST W OR W WO CONT    Impression  1. No pulmonary emboli. 2. No acute finding. MRI Results (maximum last 3): Results from [place] encounter on 10/20/15    MRI KNEE LT WO CONT    Impression  :  1. No meniscal tear demonstrated. 2. 9 x 5 mm grade 3 chondral defect of medial femoral condyle. 3. Increased TT-TG distance.  Grade 2-3 chondral derangement of medial  patellar facet, predominating at upper pole. Signing/Reading Doctor: Carolyn Mortensen ([id number])  Approved: JOSE Mortensen ([id number])  Oct 21 2015  9:47AM      Nuclear Medicine Results (maximum last 3): No results found for this or any previous visit. US Results (maximum last 3): Results from Hospital Encounter encounter on 07/02/15    US TRANSVAGINAL    Impression  :  Heterogeneous uterus suggesting possible adenomyosis. Possible 1.4 cm uterine fibroid. Signing/Reading Doctor: Chase Schwartz ([id number])  Approved: Chase Schwartz ([id number])  Jul 2 2015 12:19PM      US PELV NON OBS    Impression  :  Heterogeneous uterus suggesting possible adenomyosis. Possible 1.4 cm uterine fibroid.                   Signing/Reading Doctor: Chase Scwhartz ([id number])  Approved: Chase Schwartz ([id number])  Jul 2 2015 12:19PM          Current Facility-Administered Medications:     0.9% sodium chloride infusion, 75 mL/hr, IntraVENous, CONTINUOUS, Lloyd Antonio DO    sodium chloride (NS) flush 5-40 mL, 5-40 mL, IntraVENous, Q8H, Elizabeth Hines NP, 10 mL at 07/13/22 0618    sodium chloride (NS) flush 5-40 mL, 5-40 mL, IntraVENous, PRN, Thomas Najera, NP    acetaminophen (TYLENOL) tablet 650 mg, 650 mg, Oral, Q6H PRN **OR** acetaminophen (TYLENOL) suppository 650 mg, 650 mg, Rectal, Q6H PRN, Thomas Najera NP    polyethylene glycol (MIRALAX) packet 17 g, 17 g, Oral, DAILY PRN, Thomas Najera, NP    ondansetron (ZOFRAN ODT) tablet 4 mg, 4 mg, Oral, Q8H PRN **OR** ondansetron (ZOFRAN) injection 4 mg, 4 mg, IntraVENous, Q6H PRN, Thomas Najera, NP, 4 mg at 07/12/22 1650    morphine injection 1 mg, 1 mg, IntraVENous, Q4H PRN, Thomas Najera, NP, 1 mg at 07/12/22 2044    diazePAM (VALIUM) tablet 5 mg, 5 mg, Oral, Q6H PRN, Thomas Najera NP, 5 mg at 07/13/22 1035    pantoprazole (PROTONIX) 40 mg in 0.9% sodium chloride 10 mL injection, 40 mg, IntraVENous, Q12H, Josy Hines NP, 40 mg at 07/13/22 0903   colchicine tablet 0.6 mg, 0.6 mg, Oral, DAILY, Azul Gell, NP, 0.6 mg at 07/13/22 5816      Case discussed with collaborating physician Dr. Britt Zavala and our impression and recommendations are as follows:     1. Atypical chest pain:   - 8/10 sharp, radiating chest pain for the past 12 hours. - Troponins are negative x 2.   - EKGs  Nonischemic. No ST elevation or U waves, UT depression noted. Will order CRP and repeat EKG in the AM. CTA chest negative  - OP echo on 3/22 showed EF of 65-70%. Will repeat limited echo. - continue morphine for chest pain and Zofran for nausea. - 3/22 Treadmilll stress test normal  - discussed the risks and benefits of a cardiac catheterization with the patient and with her  and she has agreed to the procedure. Written consent obatined. Remains NPO at midnight. 2. Hypotension:   - blood pressure below goal 97/64  - c/o dizziness and palpitaitons      Thank you for involving us in the care of this patient. Please do not hesitate to call if additional questions arise. If after hours please call [phone]. Dr. Beatriz Garcia Cardiology  [address] [facility] [address], [address] [facility]  [phone]

## 2022-07-14 VITALS
HEART RATE: 66 BPM | OXYGEN SATURATION: 98 % | WEIGHT: 243 LBS | TEMPERATURE: 97.9 F | DIASTOLIC BLOOD PRESSURE: 60 MMHG | HEIGHT: 63 IN | SYSTOLIC BLOOD PRESSURE: 94 MMHG | BODY MASS INDEX: 43.05 KG/M2 | RESPIRATION RATE: 16 BRPM

## 2022-07-14 LAB
ATRIAL RATE: 78 BPM
CALCULATED P AXIS, ECG09: 63 DEGREES
CALCULATED R AXIS, ECG10: 27 DEGREES
CALCULATED T AXIS, ECG11: 30 DEGREES
DIAGNOSIS, 93000: NORMAL
P-R INTERVAL, ECG05: 186 MS
Q-T INTERVAL, ECG07: 384 MS
QRS DURATION, ECG06: 72 MS
QTC CALCULATION (BEZET), ECG08: 437 MS
VENTRICULAR RATE, ECG03: 78 BPM

## 2022-07-14 PROCEDURE — 96376 TX/PRO/DX INJ SAME DRUG ADON: CPT

## 2022-07-14 PROCEDURE — C9113 INJ PANTOPRAZOLE SODIUM, VIA: HCPCS | Performed by: NURSE PRACTITIONER

## 2022-07-14 PROCEDURE — 74011250637 HC RX REV CODE- 250/637: Performed by: NURSE PRACTITIONER

## 2022-07-14 PROCEDURE — 74011000250 HC RX REV CODE- 250: Performed by: NURSE PRACTITIONER

## 2022-07-14 PROCEDURE — 74011250637 HC RX REV CODE- 250/637: Performed by: STUDENT IN AN ORGANIZED HEALTH CARE EDUCATION/TRAINING PROGRAM

## 2022-07-14 PROCEDURE — 74011250636 HC RX REV CODE- 250/636: Performed by: EMERGENCY MEDICINE

## 2022-07-14 PROCEDURE — 74011000250 HC RX REV CODE- 250: Performed by: STUDENT IN AN ORGANIZED HEALTH CARE EDUCATION/TRAINING PROGRAM

## 2022-07-14 PROCEDURE — 74011250636 HC RX REV CODE- 250/636: Performed by: NURSE PRACTITIONER

## 2022-07-14 PROCEDURE — G0378 HOSPITAL OBSERVATION PER HR: HCPCS

## 2022-07-14 RX ORDER — IBUPROFEN 400 MG/1
400 TABLET ORAL 3 TIMES DAILY
Qty: 42 TABLET | Refills: 0 | Status: SHIPPED | OUTPATIENT
Start: 2022-07-14

## 2022-07-14 RX ORDER — COLCHICINE 0.6 MG/1
0.6 TABLET ORAL DAILY
Qty: 14 TABLET | Refills: 0 | Status: SHIPPED | OUTPATIENT
Start: 2022-07-15

## 2022-07-14 RX ADMIN — SODIUM CHLORIDE, PRESERVATIVE FREE 10 ML: 5 INJECTION INTRAVENOUS at 05:40

## 2022-07-14 RX ADMIN — ACETAMINOPHEN 650 MG: 325 TABLET, FILM COATED ORAL at 08:21

## 2022-07-14 RX ADMIN — SODIUM CHLORIDE, PRESERVATIVE FREE 10 ML: 5 INJECTION INTRAVENOUS at 05:34

## 2022-07-14 RX ADMIN — COLCHICINE 0.6 MG: 0.6 TABLET, FILM COATED ORAL at 08:21

## 2022-07-14 RX ADMIN — SODIUM CHLORIDE, PRESERVATIVE FREE 40 MG: 5 INJECTION INTRAVENOUS at 08:21

## 2022-07-14 RX ADMIN — SODIUM CHLORIDE 75 ML/HR: 9 INJECTION, SOLUTION INTRAVENOUS at 00:10

## 2022-07-14 RX ADMIN — IBUPROFEN 400 MG: 400 TABLET ORAL at 08:22

## 2022-07-14 NOTE — PROGRESS NOTES
The purpose of the visit was to [facility] an advance directive on the patient. The patent was being visited by her significant other, however she welcomed the visit. The patient mentioned in an earlier visit how she was grateful to have overcome some difficult times. She mentioned in times past her struggles with the estranged relationship with her father, and how she allowed the pain to inspire her to do things she's done. The patient shared that she trust her daughter and her boyfriend to carry out her wishes concerning her health care if she became unable to do so. She shared that she understand the benefit of having an advance directive. The  listened empathically, answered questions, facilitated conversation about a directive, documented patient's decision makers, placed documents in chart, and returned original plus additional copies to patient. [facility] Joyce Dawson.    can be reached by calling the  at [facility]  [phone]

## 2022-07-14 NOTE — PROGRESS NOTES
Cardiology Progress Note      CHIEF COMPLAINT:    Chief Complaint   Patient presents with    Chest Pain         HISTORY OF PRESENT ILLNESS:  William Clement is a 50y.o. year-old female with past medical history significant for sarcoidosis and arthritis who was admitted to the hospital for further evaluation of chest pain. Patient states her pain began last night and persisted into this morning. She went to work but the pain continued, prompting her to call EMS. Pain is described as constant, sharp, 8/10 pain that radiates into her chest and back. There are no relieving or exacerbating factors. She offers no other complaints. Patient was last seen on 22.    : Patient was seen and examined. Continues to feel unwell. Blood pressure remains on the low side of normal. Chest pain has improved with morphine. : Awake and alert. Ambulating in the room with no issues. No chest pain or shortness of breath. Records from hospital admission course thus far reviewed.       Telemetry Review: Remains in normal sinus rhythm, HR 80s      PAST MEDICAL HISTORY:    Past Medical History:   Diagnosis Date    Coagulation disorder ([facility] [address]     sickle cell trait- not disease    Ill-defined condition     Sarcoidosis    Sarcoidosis        PAST SURGICAL HISTORY:   Past Surgical History:   Procedure Laterality Date    HX  SECTION      HX GYN      tubal ligation    HX HEENT      wisdom teeth extracted    HX LAP CHOLECYSTECTOMY         ALLERGIES:    Allergies   Allergen Reactions    Bactrim [Sulfamethoprim Ds] Hives and Other (comments)     flushing       FAMILY HISTORY:    Family History   Problem Relation Age of Onset    Hypertension Mother    Ashley Troy Gout Mother     Hypertension Father     Diabetes Father     OSTEOARTHRITIS Father     Sickle Cell Trait Father     Sickle Cell Trait Sister     Sickle Cell Trait Brother     Sickle Cell Trait Brother     Sickle Cell Anemia Brother     No Known Problems Other     No Known Problems Son     No Known Problems Son     No Known Problems Daughter     No Known Problems Daughter     No Known Problems Daughter     No Known Problems Daughter        SOCIAL HISTORY:    Tobacco: Never smoker  Drugs: Not documented  ETOH: Not documented    HOME MEDICATIONS:    Prior to Admission Medications   Prescriptions Last Dose Informant Patient Reported? Taking? Cetirizine (ZyrTEC) 10 mg cap Not Taking at Unknown time  Yes No   Sig: Take  by mouth. Patient not taking: Reported on 2022   DULoxetine (CYMBALTA) 60 mg capsule Not Taking at Unknown time  No No   Sig: Take 2 Caps by mouth daily (with breakfast). Patient not taking: Reported on 2022   acetaminophen (TylenoL) 325 mg tablet Not Taking at Unknown time  Yes No   Sig: Take 650 mg by mouth every four (4) hours as needed for Pain. Patient not taking: Reported on 2022   azelastine (ASTELIN) 137 mcg (0.1 %) nasal spray Not Taking at Unknown time  No No   Si Baker by Both Nostrils route two (2) times a day. Use in each nostril as directed   Patient not taking: Reported on 2022   diclofenac EC (VOLTAREN) 75 mg EC tablet Not Taking at Unknown time  No No   Sig: TAKE ONE TABLET BY MOUTH TWICE DAILY   Patient not taking: Reported on 2022   fluticasone (FLONASE) 50 mcg/actuation nasal spray Not Taking at Unknown time  No No   Si Sprays by Both Nostrils route daily. Patient not taking: Reported on 2022   levonorgestrel-ethinyl estradiol (ALTAVERA, 28,) 0.15-0.03 mg per tablet Not Taking at Unknown time  Yes No   Sig: Take  by mouth daily. Patient not taking: Reported on 2022   traMADol (ULTRAM) 50 mg tablet Not Taking at Unknown time  No No   Sig: Take 1 Tab by mouth every eight (8) hours as needed for Pain. Max Daily Amount: 150 mg.    Patient not taking: Reported on 2022      Facility-Administered Medications: None       REVIEW OF SYSTEMS:  Complete review of systems performed, pertinents noted above, all other systems are negative. Patient Vitals for the past 24 hrs:   Temp Pulse Resp BP SpO2   07/14/22 0807 97.9 °F (36.6 °C) 66 16 94/60 98 %   07/14/22 0404 97.6 °F (36.4 °C) 77 16 98/62 98 %   07/13/22 2327 97.8 °F (36.6 °C) 88 18 105/69 96 %   07/13/22 1946 98 °F (36.7 °C) 99 18 106/71 95 %   07/13/22 1723 -- 87 -- 105/67 97 %   07/13/22 1700 -- 84 17 139/76 97 %   07/13/22 1640 -- 85 15 117/71 96 %   07/13/22 1635 -- 82 16 119/71 95 %   07/13/22 1630 -- 84 16 112/73 95 %   07/13/22 1625 -- 75 15 127/80 96 %       PHYSICAL EXAMINATION:    General: NAD, A&O  HEENT: Normocephalic, PERRL, no drainage  Neck: Supple, Trachea midline, No JVD  RESP: CTA bilaterally. + Symmetrical chest movement. On RA  Cardiovascular: RRR no MRG  PVS: No rubor, cyanosis,no edema  ABD: soft, NT, Normoactive BS  Derm: Warm/Dry/Intact with no lesions  Neuro: A&O PPTS, No focal deficits  PSYCH: No anxiety or agitation    Recent labs results and imaging reviewed.       Recent Results (from the past 24 hour(s))   DUPLEX CAROTID BILATERAL    Collection Time: 07/13/22 11:35 AM   Result Value Ref Range    Left CCA dist sys 92.3 cm/s    Left CCA dist negron 33.2 cm/s    Left CCA prox sys 88.7 cm/s    Left CCA prox negron 26.5 cm/s    Left ICA dist sys 122.0 cm/s    Left ICA dist negron 51.9 cm/s    Left ICA prox sys 96.5 cm/s    Left ICA prox negron 33.8 cm/s    Left ECA sys 95.9 cm/s    LEFT EXTERNAL CAROTID ARTERY D 19.90 cm/s    Left subclavian prox PSV 88.7 cm/s    Left subclavian prox EDV 0.0 cm/s    Left vertebral sys 68.0 cm/s    LEFT VERTEBRAL ARTERY D 22.90 cm/s    Right cca dist sys 87.8 cm/s    Right CCA dist negron 29.8 cm/s    Right CCA prox sys 147.0 cm/s    Right CCA prox negron 40.4 cm/s    Right ICA dist sys 134.0 cm/s    Right ICA dist negron 47.4 cm/s    Right ICA prox sys 85.5 cm/s    Right ICA prox negron 33.6 cm/s    Right eca sys 108.0 cm/s    RIGHT EXTERNAL CAROTID ARTERY D 22.90 cm/s    Right subclavian prox .0 cm/s    Right subclavian prox EDV 0.0 cm/s    Right vertebral sys 26.5 cm/s    RIGHT VERTEBRAL ARTERY D 6.07 cm/s    Left arm  mmHg    Right arm  mmHg    Right ICA/CCA sys 0.97     Left ICA/CCA sys 1.05    ECHO ADULT COMPLETE    Collection Time: 07/13/22  3:27 PM   Result Value Ref Range    LA Volume 4C 23 22 - 52 mL    LA Volume Index 4C 11 (A) 16 - 34 mL/m2    RV Free Wall Peak S' 16 cm/s    Est. RA Pressure 3 mmHg    PASP 34 mmHg    LV EDV A2C 47 mL    LV EDV A4C 42 mL    LV ESV A2C 16 mL    LV ESV A4C 23 mL    IVSd 0.9 0.6 - 0.9 cm    LVIDd 3.3 (A) 3.9 - 5.3 cm    LVIDs 2.2 cm    LVOT Diameter 2.1 cm    LVOT Mean Gradient 4 mmHg    LVOT VTI 24.7 cm    LVOT Peak Velocity 1.4 m/s    LVOT Peak Gradient 8 mmHg    LVPWd 1.1 (A) 0.6 - 0.9 cm    LV E' Lateral Velocity 14 cm/s    LV E' Septal Velocity 9 cm/s    LV Ejection Fraction A2C 66 %    LV Ejection Fraction A4C 47 %    EF BP 54 (A) 55 - 100 %    LVOT Area 3.5 cm2    LVOT SV 85.5 ml    LA Minor Axis 4.2 cm    LA Major [place] 4.2 cm    LA Area 2C 14.2 cm2    LA Area 4C 11.0 cm2    LA Volume BP 29 22 - 52 mL    LA Diameter 3.4 cm    RA Area 4C 10.9 cm2    RA Volume 27 ml    AV Mean Gradient 4 mmHg    AV VTI 24.7 cm    AV Mean Velocity 0.9 m/s    AV Peak Velocity 1.3 m/s    AV Peak Gradient 7 mmHg    AV Area by VTI 3.5 cm2    AV Area by Peak Velocity 3.6 cm2    Aortic Root 3.1 cm    Ascending Aorta 2.6 cm    MR Peak Velocity 0.9 m/s    MR Peak Gradient 3 mmHg    MV E Wave Deceleration Time 560.0 ms    MV A Velocity 0.63 m/s    MV E Velocity 0.99 m/s    PV Max Velocity 0.9 m/s    PV Peak Gradient 3 mmHg    RV Basal Dimension 3.3 cm    RV Mid Dimension 2.5 cm    TR Max Velocity 1.74 m/s    TR Peak Gradient 12 mmHg    Fractional Shortening 2D 33 28 - 44 %    LV ESV Index A4C 11 mL/m2    LV EDV Index A4C 20 mL/m2    LV ESV Index A2C 8 mL/m2    LV EDV Index A2C 22 mL/m2    LVIDd Index 1.57 cm/m2    LVIDs Index 1.05 cm/m2    LV RWT Ratio 0. 67     LV Mass 2D 94.6 67 - 162 g    LV Mass 2D Index 45.0 43 - 95 g/m2    MV E/A 1.57     E/E' Ratio (Averaged) 9.04     E/E' Lateral 7.07     E/E' Septal 11.00     LA Volume Index BP 14 (A) 16 - 34 ml/m2    LVOT Stroke Volume Index 40.7 mL/m2    LA Size Index 1.62 cm/m2    LA/AO Root Ratio 1.10     RA Volume Index A4C 13 mL/m2    Ao Root Index 1.48 cm/m2    Ascending Aorta Index 1.24 cm/m2    AV Velocity Ratio 1.08     LVOT:AV VTI Index 1.00     BHAVIK/BSA VTI 1.7 cm2/m2    BHAVIK/BSA Peak Velocity 1.7 cm2/m2    RVSP 15 mmHg       XR Results (maximum last 3): Results from [place] encounter on 07/12/22    XR CHEST SNGL V    Impression  No acute cardiopulmonary abnormality. Results from [place] encounter on 08/11/15    XR SPINE LUMB 2 OR 3 V    Impression  : Negative. Signing/Reading Doctor: Unique Salamanca ([id number])  Approved: Unique Salamanca ([id number])  Aug 11 2015  3:32AM      CT Results (maximum last 3): Results from [place] encounter on 07/12/22    CTA CHEST W OR W WO CONT    Impression  1. No pulmonary emboli. 2. No acute finding. MRI Results (maximum last 3): Results from [place] encounter on 10/20/15    MRI KNEE LT WO CONT    Impression  :  1. No meniscal tear demonstrated. 2. 9 x 5 mm grade 3 chondral defect of medial femoral condyle. 3. Increased TT-TG distance. Grade 2-3 chondral derangement of medial  patellar facet, predominating at upper pole. Signing/Reading Doctor: Ashely Jaime. Rakesh Beltran ([id number])  Approved: JOSE Beltran ([id number])  Oct 21 2015  9:47AM      Nuclear Medicine Results (maximum last 3): No results found for this or any previous visit. US Results (maximum last 3): Results from Hospital Encounter encounter on 07/02/15    US TRANSVAGINAL    Impression  :  Heterogeneous uterus suggesting possible adenomyosis. Possible 1.4 cm uterine fibroid.                   Signing/Reading Doctor: Mannie Mann ([id number])  Approved: Marily Watters ([id number])  2015 12:19PM      US PELV NON OBS    Impression  :  Heterogeneous uterus suggesting possible adenomyosis. Possible 1.4 cm uterine fibroid.                   Signing/Reading Doctor: Marily Watters ([id number])  Approved: Marily Watters ([id number])  2015 12:19PM          Current Facility-Administered Medications:     sodium chloride (NS) flush 5-40 mL, 5-40 mL, IntraVENous, Q8H, Aaron Evans MD, 10 mL at 22 0534    sodium chloride (NS) flush 5-40 mL, 5-40 mL, IntraVENous, PRN, Aaron Evans MD    ibuprofen (MOTRIN) tablet 400 mg, 400 mg, Oral, TID, Aaron Evans MD, 400 mg at 22 0359    0.9% sodium chloride infusion, 75 mL/hr, IntraVENous, CONTINUOUS, Lloyd Antonio DO, Last Rate: 75 mL/hr at 22 0010, 75 mL/hr at 22 0010    sodium chloride (NS) flush 5-40 mL, 5-40 mL, IntraVENous, Q8H, Apple Hines NP, 10 mL at 22 0540    sodium chloride (NS) flush 5-40 mL, 5-40 mL, IntraVENous, PRN, Lisa Alford NP    acetaminophen (TYLENOL) tablet 650 mg, 650 mg, Oral, Q6H PRN, 650 mg at 22 0328 **OR** acetaminophen (TYLENOL) suppository 650 mg, 650 mg, Rectal, Q6H PRN, Lisa Alford NP    polyethylene glycol (MIRALAX) packet 17 g, 17 g, Oral, DAILY PRN, Lisa Alford NP    ondansetron (ZOFRAN ODT) tablet 4 mg, 4 mg, Oral, Q8H PRN **OR** ondansetron (ZOFRAN) injection 4 mg, 4 mg, IntraVENous, Q6H PRN, Lisa Alford NP, 4 mg at 22 1650    morphine injection 1 mg, 1 mg, IntraVENous, Q4H PRN, Lisa Alford NP, 1 mg at 22    diazePAM (VALIUM) tablet 5 mg, 5 mg, Oral, Q6H PRN, Lisa Alford, NP, 5 mg at 22    pantoprazole (PROTONIX) 40 mg in 0.9% sodium chloride 10 mL injection, 40 mg, IntraVENous, Q12H, Donny, Apple Moreland NP, 40 mg at 22 0363    colchicine tablet 0.6 mg, 0.6 mg, Oral, DAILY, Khalida Ortiz NP, 0.6 mg at 22 7415      Case discussed with collaborating physician Dr. Margret Larsen and our impression and recommendations are as follows:     1. Atypical chest pain:   - 8/10 sharp, radiating chest pain for the past 12 hours. - Troponins are negative x 2.   - EKGs  Nonischemic. No ST elevation or U waves, OH depression noted. Will order CRP and repeat EKG in the AM. CTA chest negative  - OP echo on 3/22 showed EF of 65-70%. Will repeat limited echo. - continue morphine for chest pain and Zofran for nausea. - 3/22 Treadmilll stress test normal  - S/p cath with luminal irregularities, recommend medical management and lifestyle modifications. 2. Hypotension:   - blood pressure below goal 94/60  - no medications at this time    3. Pericarditis:   - history of COVID in Jan 22  - continue colchicine and ibuprofen. Patient is clear for discharge from a cardiology standpoint. Patient has a follow-up appointment on 07/26/22 at 3:30 in [place], [place]. Thank you for involving us in the care of this patient. Please do not hesitate to call if additional questions arise. If after hours please call [phone]. Dr. Julieta Nissen Cardiology  [address], [address] [facility]  [phone]

## 2022-07-14 NOTE — DISCHARGE SUMMARY
Hospitalist Discharge Summary     Patient ID:    Uma Faust  [id number]  50 y.o.  1970    Admit date: 7/12/2022    Discharge date : 7/14/2022      Final Diagnoses:     CP due to Presumed Clinical Pericarditis (Viral syndrome?)    Reason for Hospitalization:  Uma Faust is a 50 y.o. female who presents via EMS for cc of chest pain, onset last night. She reports driving to work this morning pain continued to worsen, accompanied with sob and dizziness. she subsequently pulled her car over and call 911. She reports feeling palpitations and nausea. Pain characterized as tightness, non radiating. Pain is non reproducible. PM significant for sarcoidosis, djd, borderline hypertension. She reports covid around January 2022 and has continued to decline in physical health every since. Reports having a stress test with [facility] cardiology April 2022.      In the ED troponin x 2 negative. CXR negative for acute pulmonary findings. Routine labwork unremarkable. Hospitalist team consulted for further evaluation and treatment. On examination she continues to report chest pain, mid sternal non radiating. Not improved with medication or rest, Non reproducible. Will admit to observation status. Consult cardiology.      Patient elects full code. [address] [phone]. Hospital Course:     Patient observed on Tele. CTA negative. Evaluated by [facility] Cardiology. Troponin Negative. CRP elevated. Underwent LHC for persistent CP by Dr. Alban Quispe:    · Procedures performed: LHC, selective right and left coronary angiography, moderate sedation 17 minutes  · Findings: Luminal irregularities  · Recommendations: medical management        ECHO:      Left Ventricle: Normal left ventricular systolic function with a visually estimated EF of 60 - 65%. Left ventricle size is normal. Normal wall thickness. Normal wall motion. Normal diastolic function.     Right Ventricle: Right ventricle is mildly dilated. Normal wall thickness. RV EDV is mildly increased.   Mitral Valve: Mildly thickened leaflet. Mild annular calcification of the mitral valve. Thickened subvalvular apparatus.   Tricuspid Valve: Moderate regurgitation. Clinically stable. Discussed with patient/; and Cardiology --> Treat for Pericarditis. Patient had Covid-19 ~6-7 months ago; unclear if CP related. Discharge Medications:   Current Discharge Medication List      START taking these medications    Details   colchicine 0.6 mg tablet Take 1 Tablet by mouth daily. Indications: inflammation of the covering of the heart or pericardium  Qty: 14 Tablet, Refills: 0  Start date: 7/15/2022      ibuprofen (MOTRIN) 400 mg tablet Take 1 Tablet by mouth three (3) times daily. Qty: 42 Tablet, Refills: 0  Start date: 7/14/2022         STOP taking these medications       acetaminophen (TylenoL) 325 mg tablet Comments:   Reason for Stopping:         Cetirizine (ZyrTEC) 10 mg cap Comments:   Reason for Stopping:         diclofenac EC (VOLTAREN) 75 mg EC tablet Comments:   Reason for Stopping:         fluticasone (FLONASE) 50 mcg/actuation nasal spray Comments:   Reason for Stopping:         DULoxetine (CYMBALTA) 60 mg capsule Comments:   Reason for Stopping:         azelastine (ASTELIN) 137 mcg (0.1 %) nasal spray Comments:   Reason for Stopping:         traMADol (ULTRAM) 50 mg tablet Comments:   Reason for Stopping:         levonorgestrel-ethinyl estradiol (ALTAVERA, 28,) 0.15-0.03 mg per tablet Comments:   Reason for Stopping: Follow up Care:    1. Becky Gastelum MD in 1-2 weeks. Follow-up Information     Follow up With Specialties Details Why Contact Info    Becky Gastelum MD Family Medicine   [address]  [address] [facility] [address]  [facility]  [phone]              Patient Follow Up Instructions:    Activity: Activity as tolerated and Activity as tolerated and no driving for today  Diet:  Cardiac Diet    Condition at Discharge:  Stable  __________________________________________________________________    Disposition  Home or Self Care  ____________________________________________________________________    Code Status:  Full Code  ___________________________________________________________________    Discharge Exam:  Patient seen and examined by me on discharge day. Pertinent Findings:    Gen:    Not in distress  Chest: Clear lungs  CVS:   Regular rhythm.   No edema  Abd:  Soft, not distended, not tender  Neuro:  Alert  M/S: Right wrist dressing (site of [facility])    CONSULTATIONS: Cardiology    Significant Diagnostic Studies:   Recent Results (from the past 24 hour(s))   DUPLEX CAROTID BILATERAL    Collection Time: 07/13/22 11:35 AM   Result Value Ref Range    Left CCA dist sys 92.3 cm/s    Left CCA dist negron 33.2 cm/s    Left CCA prox sys 88.7 cm/s    Left CCA prox negron 26.5 cm/s    Left ICA dist sys 122.0 cm/s    Left ICA dist negron 51.9 cm/s    Left ICA prox sys 96.5 cm/s    Left ICA prox negron 33.8 cm/s    Left ECA sys 95.9 cm/s    LEFT EXTERNAL CAROTID ARTERY D 19.90 cm/s    Left subclavian prox PSV 88.7 cm/s    Left subclavian prox EDV 0.0 cm/s    Left vertebral sys 68.0 cm/s    LEFT VERTEBRAL ARTERY D 22.90 cm/s    Right cca dist sys 87.8 cm/s    Right CCA dist negron 29.8 cm/s    Right CCA prox sys 147.0 cm/s    Right CCA prox negron 40.4 cm/s    Right ICA dist sys 134.0 cm/s    Right ICA dist negron 47.4 cm/s    Right ICA prox sys 85.5 cm/s    Right ICA prox negron 33.6 cm/s    Right eca sys 108.0 cm/s    RIGHT EXTERNAL CAROTID ARTERY D 22.90 cm/s    Right subclavian prox .0 cm/s    Right subclavian prox EDV 0.0 cm/s    Right vertebral sys 26.5 cm/s    RIGHT VERTEBRAL ARTERY D 6.07 cm/s    Left arm  mmHg    Right arm  mmHg    Right ICA/CCA sys 0.97     Left ICA/CCA sys 1.05    ECHO ADULT COMPLETE    Collection Time: 07/13/22  3:27 PM   Result Value Ref Range    LA Volume 4C 23 22 - 52 mL    LA Volume Index 4C 11 (A) 16 - 34 mL/m2    RV Free Wall Peak S' 16 cm/s    Est. RA Pressure 3 mmHg    PASP 34 mmHg    LV EDV A2C 47 mL    LV EDV A4C 42 mL    LV ESV A2C 16 mL    LV ESV A4C 23 mL    IVSd 0.9 0.6 - 0.9 cm    LVIDd 3.3 (A) 3.9 - 5.3 cm    LVIDs 2.2 cm    LVOT Diameter 2.1 cm    LVOT Mean Gradient 4 mmHg    LVOT VTI 24.7 cm    LVOT Peak Velocity 1.4 m/s    LVOT Peak Gradient 8 mmHg    LVPWd 1.1 (A) 0.6 - 0.9 cm    LV E' Lateral Velocity 14 cm/s    LV E' Septal Velocity 9 cm/s    LV Ejection Fraction A2C 66 %    LV Ejection Fraction A4C 47 %    EF BP 54 (A) 55 - 100 %    LVOT Area 3.5 cm2    LVOT SV 85.5 ml    LA Minor Axis 4.2 cm    LA Major [place] 4.2 cm    LA Area 2C 14.2 cm2    LA Area 4C 11.0 cm2    LA Volume BP 29 22 - 52 mL    LA Diameter 3.4 cm    RA Area 4C 10.9 cm2    RA Volume 27 ml    AV Mean Gradient 4 mmHg    AV VTI 24.7 cm    AV Mean Velocity 0.9 m/s    AV Peak Velocity 1.3 m/s    AV Peak Gradient 7 mmHg    AV Area by VTI 3.5 cm2    AV Area by Peak Velocity 3.6 cm2    Aortic Root 3.1 cm    Ascending Aorta 2.6 cm    MR Peak Velocity 0.9 m/s    MR Peak Gradient 3 mmHg    MV E Wave Deceleration Time 560.0 ms    MV A Velocity 0.63 m/s    MV E Velocity 0.99 m/s    PV Max Velocity 0.9 m/s    PV Peak Gradient 3 mmHg    RV Basal Dimension 3.3 cm    RV Mid Dimension 2.5 cm    TR Max Velocity 1.74 m/s    TR Peak Gradient 12 mmHg    Fractional Shortening 2D 33 28 - 44 %    LV ESV Index A4C 11 mL/m2    LV EDV Index A4C 20 mL/m2    LV ESV Index A2C 8 mL/m2    LV EDV Index A2C 22 mL/m2    LVIDd Index 1.57 cm/m2    LVIDs Index 1.05 cm/m2    LV RWT Ratio 0.67     LV Mass 2D 94.6 67 - 162 g    LV Mass 2D Index 45.0 43 - 95 g/m2    MV E/A 1.57     E/E' Ratio (Averaged) 9.04     E/E' Lateral 7.07     E/E' Septal 11.00     LA Volume Index BP 14 (A) 16 - 34 ml/m2    LVOT Stroke Volume Index 40.7 mL/m2    LA Size Index 1.62 cm/m2    LA/AO Root Ratio 1.10     RA Volume Index A4C 13 mL/m2    Ao Root Index 1.48 cm/m2 Ascending Aorta Index 1.24 cm/m2    AV Velocity Ratio 1.08     LVOT:AV VTI Index 1.00     BHAVIK/BSA VTI 1.7 cm2/m2    BHAVIK/BSA Peak Velocity 1.7 cm2/m2    RVSP 15 mmHg     DUPLEX CAROTID BILATERAL   Final Result      CTA CHEST W OR W WO CONT   Final Result   1. No pulmonary emboli. 2. No acute finding. XR CHEST SNGL V   Final Result   No acute cardiopulmonary abnormality.                  Signed:  Chad Osborn MD  7/14/2022  10:50 AM

## 2022-07-14 NOTE — ACP (ADVANCE CARE PLANNING)
Advance Care Planning   Advance Care Planning Inpatient Note  [facility]    Today's Date: 7/14/2022  Unit: [facility] WEST CARDIAC TELEMETRY    Received request from patient. Upon review of chart and communication with care team, patient's decision making abilities are not in question. Spouse was/were present in the room during visit. Goals of ACP Conversation:  Discuss Advance Care planning documents    Health Care Decision Makers:      Primary Decision Maker: Aditi Ramirez - Child - [phone]    Secondary Decision Maker: Gurpreetbetzaidamatty Severs - Other Non-relative - [phone]      Summary:  Completed [facility]    Advance Care Planning Documents (Patient Wishes) on file:  None     Assessment:      The purpose of the visit was to [facility] an advance directive on the patient. The patent was being visited by her significant other, however she welcomed the visit. The patient mentioned in an earlier visit how she was grateful to have overcome some difficult times. She mentioned in times past her struggles with the estranged relationship with her father, and how she allowed the pain to inspire her to do things she's done. The patient shared that she trust her daughter and her boyfriend to carry out her wishes concerning her health care if she became unable to do so. She shared that she understand the benefit of having an advance directive. The  listened empathically, answered questions, facilitated conversation about a directive, documented patient's decision makers, placed documents in chart, and returned original plus additional copies to patient.      Interventions:  Provided education on documents for clarity and greater understanding  Discussed and provided education on state decision maker hierarchy  Assisted in the completion of documents according to patient's wishes at this time  Encouraged ongoing ACP conversation with future decision makers and loved ones    Care Preferences Communicated:  No    Outcomes/Plan:  New Advance Directive completed    Chaplain Jess on 7/14/2022 at 10:51 AM

## 2022-07-14 NOTE — DISCHARGE INSTRUCTIONS
Patient Education        Chest Pain: Care Instructions  Your Care Instructions     There are many things that can cause chest pain. Some are not serious and will get better on their own in a few days. But some kinds of chest pain need more testing and treatment. Your doctor may have recommended a follow-up visit in the next 8 to 12 hours. If you are not getting better, you may need more tests or treatment. Even though your doctor has released you, you still need to watch for any problems. The doctor carefully checked you, but sometimes problems can develop later. If you have new symptoms or if your symptoms do not get better, get medical care right away. If you have worse or different chest pain or pressure that lasts more than 5 minutes or you passed out (lost consciousness), call 911 or seek other emergency help right away. A medical visit is only one step in your treatment. Even if you feel better, you still need to do what your doctor recommends, such as going to all suggested follow-up appointments and taking medicines exactly as directed. This will help you recover and help prevent future problems. How can you care for yourself at home? · Rest until you feel better. · Take your medicine exactly as prescribed. Call your doctor if you think you are having a problem with your medicine. · Do not drive after taking a prescription pain medicine. When should you call for help? Call 911 if:     · You passed out (lost consciousness).     · You have severe difficulty breathing.     · You have symptoms of a heart attack. These may include:  ? Chest pain or pressure, or a strange feeling in your chest.  ? Sweating. ? Shortness of breath. ? Nausea or vomiting. ? Pain, pressure, or a strange feeling in your back, neck, jaw, or upper belly or in one or both shoulders or arms. ? Lightheadedness or sudden weakness. ? A fast or irregular heartbeat.   After you call 911, the  may tell you to chew 1 adult-strength or 2 to 4 low-dose aspirin. Wait for an ambulance. Do not try to drive yourself. Call your doctor today if:     · You have any trouble breathing.     · Your chest pain gets worse.     · You are dizzy or lightheaded, or you feel like you may faint.     · You are not getting better as expected.     · You are having new or different chest pain. Where can you learn more? Go to http://www.hawkins.com/  Enter A120 in the search box to learn more about \"Chest Pain: Care Instructions. \"  Current as of: July 1, 2021               Content Version: 13.2  © 9061-8117 [employer]. Care instructions adapted under license by [employer] (which disclaims liability or warranty for this information). If you have questions about a medical condition or this instruction, always ask your healthcare professional. [place] [address] any warranty or liability for your use of this information. Patient Education        Musculoskeletal Chest Pain: Care Instructions  Your Care Instructions     Chest pain is not always a sign that something is wrong with your heart or that you have another serious problem. The doctor thinks your chest pain is caused by strained muscles or ligaments, inflamed chest cartilage, or another problem in your chest, rather than by your heart. You may need more tests to find the cause of your chest pain. Follow-up care is a key part of your treatment and safety. Be sure to make and go to all appointments, and call your doctor if you are having problems. It's also a good idea to know your test results and keep a list of the medicines you take. How can you care for yourself at home? · Take pain medicines exactly as directed. ? If the doctor gave you a prescription medicine for pain, take it as prescribed. ? If you are not taking a prescription pain medicine, ask your doctor if you can take an over-the-counter medicine.   · Rest and protect the sore area. · Stop, change, or take a break from any activity that may be causing your pain or soreness. · Put ice or a cold pack on the sore area for 10 to 20 minutes at a time. Try to do this every 1 to 2 hours for the next 3 days (when you are awake) or until the swelling goes down. Put a thin cloth between the ice and your skin. · After 2 or 3 days, apply a heating pad set on low or a warm cloth to the area that hurts. Some doctors suggest that you go back and forth between hot and cold. · Do not wrap or tape your ribs for support. This may cause you to take smaller breaths, which could increase your risk of lung problems. · Mentholated creams such as Bengay or Icy Hot may soothe sore muscles. Follow the instructions on the package. · Follow your doctor's instructions for exercising. · Gentle stretching and massage may help you get better faster. Stretch slowly to the point just before pain begins, and hold the stretch for at least 15 to 30 seconds. Do this 3 or 4 times a day. Stretch just after you have applied heat. · As your pain gets better, slowly return to your normal activities. Any increased pain may be a sign that you need to rest a while longer. When should you call for help? Call 911 anytime you think you may need emergency care. For example, call if:    · You have chest pain or pressure. This may occur with:  ? Sweating. ? Shortness of breath. ? Nausea or vomiting. ? Pain that spreads from the chest to the neck, jaw, or one or both shoulders or arms. ? Dizziness or lightheadedness. ? A fast or uneven pulse. After calling 911, chew 1 adult-strength aspirin. Wait for an ambulance. Do not try to drive yourself.     · You have sudden chest pain and shortness of breath, or you cough up blood.    Call your doctor now or seek immediate medical care if:    · You have any trouble breathing.     · Your chest pain gets worse.     · Your chest pain occurs consistently with exercise and is relieved by rest.   Watch closely for changes in your health, and be sure to contact your doctor if:    · Your chest pain does not get better after 1 week. Where can you learn more? Go to http://www.hawkins.com/  Enter V293 in the search box to learn more about \"Musculoskeletal Chest Pain: Care Instructions. \"  Current as of: July 1, 2021               Content Version: 13.2  © 2006-2022 [employer]. Care instructions adapted under license by [employer] (which disclaims liability or warranty for this information). If you have questions about a medical condition or this instruction, always ask your healthcare professional. [address] any warranty or liability for your use of this information.

## 2022-07-14 NOTE — PROGRESS NOTES
Hospitalist Progress Note         PAULA Rice, FNP-C      7/14/2022      RE: Kayli Brar      To Whom it May Concern: This is to certify that Kayli Brar may return to work tentatively August 1, 2022 if cleared by specialist.      Please feel free to contact my office if you have any questions or concerns. Thank you for your assistance in this matter.     Sincerely,      Hari Bliss NP

## 2023-04-14 ENCOUNTER — HOSPITAL ENCOUNTER (OUTPATIENT)
Dept: PREADMISSION TESTING | Age: 53
Discharge: HOME | End: 2023-04-14
Payer: COMMERCIAL

## 2023-04-14 VITALS
TEMPERATURE: 96.8 F | WEIGHT: 230 LBS | SYSTOLIC BLOOD PRESSURE: 122 MMHG | HEART RATE: 85 BPM | BODY MASS INDEX: 40.75 KG/M2 | RESPIRATION RATE: 16 BRPM | HEIGHT: 63 IN | DIASTOLIC BLOOD PRESSURE: 67 MMHG | OXYGEN SATURATION: 98 %

## 2023-04-14 LAB
ALBUMIN SERPL-MCNC: 3.8 G/DL (ref 3.5–5)
ALBUMIN/GLOB SERPL: 1.2 (ref 1.1–2.2)
ALP SERPL-CCNC: 92 U/L (ref 45–117)
ALT SERPL-CCNC: 25 U/L (ref 12–78)
ANION GAP SERPL CALC-SCNC: 1 MMOL/L (ref 5–15)
APTT PPP: 26.8 SEC (ref 22.1–31)
AST SERPL-CCNC: 24 U/L (ref 15–37)
BASOPHILS # BLD: 0 K/UL (ref 0–0.1)
BASOPHILS NFR BLD: 1 % (ref 0–1)
BILIRUB SERPL-MCNC: 0.7 MG/DL (ref 0.2–1)
BUN SERPL-MCNC: 7 MG/DL (ref 6–20)
BUN/CREAT SERPL: 8 (ref 12–20)
CALCIUM SERPL-MCNC: 9.1 MG/DL (ref 8.5–10.1)
CHLORIDE SERPL-SCNC: 107 MMOL/L (ref 97–108)
CO2 SERPL-SCNC: 30 MMOL/L (ref 21–32)
CREAT SERPL-MCNC: 0.92 MG/DL (ref 0.55–1.02)
DIFFERENTIAL METHOD BLD: NORMAL
EOSINOPHIL # BLD: 0.1 K/UL (ref 0–0.4)
EOSINOPHIL NFR BLD: 1 % (ref 0–7)
ERYTHROCYTE [DISTWIDTH] IN BLOOD BY AUTOMATED COUNT: 14.5 % (ref 11.5–14.5)
GLOBULIN SER CALC-MCNC: 3.3 G/DL (ref 2–4)
GLUCOSE SERPL-MCNC: 87 MG/DL (ref 65–100)
HCT VFR BLD AUTO: 39.4 % (ref 35–47)
HGB BLD-MCNC: 13 G/DL (ref 11.5–16)
IMM GRANULOCYTES # BLD AUTO: 0 K/UL (ref 0–0.04)
IMM GRANULOCYTES NFR BLD AUTO: 0 % (ref 0–0.5)
INR PPP: 1 (ref 0.9–1.1)
LYMPHOCYTES # BLD: 1.7 K/UL (ref 0.8–3.5)
LYMPHOCYTES NFR BLD: 33 % (ref 12–49)
MCH RBC QN AUTO: 28.7 PG (ref 26–34)
MCHC RBC AUTO-ENTMCNC: 33 G/DL (ref 30–36.5)
MCV RBC AUTO: 87 FL (ref 80–99)
MONOCYTES # BLD: 0.3 K/UL (ref 0–1)
MONOCYTES NFR BLD: 7 % (ref 5–13)
NEUTS SEG # BLD: 3 K/UL (ref 1.8–8)
NEUTS SEG NFR BLD: 58 % (ref 32–75)
NRBC # BLD: 0 K/UL (ref 0–0.01)
NRBC BLD-RTO: 0 PER 100 WBC
PLATELET # BLD AUTO: 341 K/UL (ref 150–400)
PMV BLD AUTO: 10.1 FL (ref 8.9–12.9)
POTASSIUM SERPL-SCNC: 4 MMOL/L (ref 3.5–5.1)
PROT SERPL-MCNC: 7.1 G/DL (ref 6.4–8.2)
PROTHROMBIN TIME: 10.9 SEC (ref 9–11.1)
RBC # BLD AUTO: 4.53 M/UL (ref 3.8–5.2)
SODIUM SERPL-SCNC: 138 MMOL/L (ref 136–145)
THERAPEUTIC RANGE,PTTT: NORMAL SECS (ref 58–77)
WBC # BLD AUTO: 5.1 K/UL (ref 3.6–11)

## 2023-04-14 PROCEDURE — 80053 COMPREHEN METABOLIC PANEL: CPT

## 2023-04-14 PROCEDURE — 85730 THROMBOPLASTIN TIME PARTIAL: CPT

## 2023-04-14 PROCEDURE — 85025 COMPLETE CBC W/AUTO DIFF WBC: CPT

## 2023-04-14 PROCEDURE — 93005 ELECTROCARDIOGRAM TRACING: CPT

## 2023-04-14 PROCEDURE — 36415 COLL VENOUS BLD VENIPUNCTURE: CPT

## 2023-04-14 PROCEDURE — 85610 PROTHROMBIN TIME: CPT

## 2023-04-16 LAB
ATRIAL RATE: 72 BPM
CALCULATED P AXIS, ECG09: 59 DEGREES
CALCULATED R AXIS, ECG10: 56 DEGREES
CALCULATED T AXIS, ECG11: 46 DEGREES
DIAGNOSIS, 93000: NORMAL
P-R INTERVAL, ECG05: 188 MS
Q-T INTERVAL, ECG07: 376 MS
QRS DURATION, ECG06: 74 MS
QTC CALCULATION (BEZET), ECG08: 411 MS
VENTRICULAR RATE, ECG03: 72 BPM

## 2023-05-02 ENCOUNTER — ANESTHESIA EVENT (OUTPATIENT)
Dept: SURGERY | Age: 53
End: 2023-05-02
Payer: COMMERCIAL

## 2023-05-02 RX ORDER — NALOXONE HYDROCHLORIDE 0.4 MG/ML
0.2 INJECTION, SOLUTION INTRAMUSCULAR; INTRAVENOUS; SUBCUTANEOUS
Status: CANCELLED | OUTPATIENT
Start: 2023-05-02

## 2023-05-02 RX ORDER — SODIUM CHLORIDE, SODIUM LACTATE, POTASSIUM CHLORIDE, CALCIUM CHLORIDE 600; 310; 30; 20 MG/100ML; MG/100ML; MG/100ML; MG/100ML
125 INJECTION, SOLUTION INTRAVENOUS CONTINUOUS
Status: CANCELLED | OUTPATIENT
Start: 2023-05-02

## 2023-05-02 RX ORDER — SODIUM CHLORIDE, SODIUM LACTATE, POTASSIUM CHLORIDE, CALCIUM CHLORIDE 600; 310; 30; 20 MG/100ML; MG/100ML; MG/100ML; MG/100ML
125 INJECTION, SOLUTION INTRAVENOUS CONTINUOUS
Status: CANCELLED | OUTPATIENT
Start: 2023-05-02 | End: 2023-05-03

## 2023-05-02 RX ORDER — FLUMAZENIL 0.1 MG/ML
0.2 INJECTION INTRAVENOUS
Status: CANCELLED | OUTPATIENT
Start: 2023-05-02

## 2023-05-03 ENCOUNTER — ANESTHESIA (OUTPATIENT)
Dept: SURGERY | Age: 53
End: 2023-05-03
Payer: COMMERCIAL

## 2023-05-03 ENCOUNTER — HOSPITAL ENCOUNTER (OUTPATIENT)
Age: 53
Setting detail: OUTPATIENT SURGERY
Discharge: HOME | End: 2023-05-03
Attending: SURGERY | Admitting: SURGERY
Payer: COMMERCIAL

## 2023-05-03 VITALS
DIASTOLIC BLOOD PRESSURE: 69 MMHG | BODY MASS INDEX: 40.47 KG/M2 | SYSTOLIC BLOOD PRESSURE: 99 MMHG | HEART RATE: 75 BPM | HEIGHT: 63 IN | OXYGEN SATURATION: 96 % | RESPIRATION RATE: 16 BRPM | TEMPERATURE: 98 F | WEIGHT: 228.4 LBS

## 2023-05-03 LAB — HCG UR QL: NEGATIVE

## 2023-05-03 PROCEDURE — 74011000250 HC RX REV CODE- 250: Performed by: SURGERY

## 2023-05-03 PROCEDURE — 77030040361 HC SLV COMPR DVT MDII -B

## 2023-05-03 PROCEDURE — 74011250636 HC RX REV CODE- 250/636: Performed by: SURGERY

## 2023-05-03 PROCEDURE — 77030002933 HC SUT MCRYL J&J -A: Performed by: SURGERY

## 2023-05-03 PROCEDURE — 74011250636 HC RX REV CODE- 250/636: Performed by: ANESTHESIOLOGY

## 2023-05-03 PROCEDURE — 77030013079 HC BLNKT BAIR HGGR 3M -A: Performed by: NURSE ANESTHETIST, CERTIFIED REGISTERED

## 2023-05-03 PROCEDURE — 77030008684 HC TU ET CUF COVD -B: Performed by: NURSE ANESTHETIST, CERTIFIED REGISTERED

## 2023-05-03 PROCEDURE — 77030002916 HC SUT ETHLN J&J -A: Performed by: SURGERY

## 2023-05-03 PROCEDURE — 77030028700 HC BLD TISS PLSM MEDT -E: Performed by: SURGERY

## 2023-05-03 PROCEDURE — 77030002986 HC SUT PROL J&J -A: Performed by: SURGERY

## 2023-05-03 PROCEDURE — 77030019940 HC BLNKT HYPOTHRM STRY -B: Performed by: SURGERY

## 2023-05-03 PROCEDURE — 76210000006 HC OR PH I REC 0.5 TO 1 HR: Performed by: SURGERY

## 2023-05-03 PROCEDURE — 77030040922 HC BLNKT HYPOTHRM STRY -A

## 2023-05-03 PROCEDURE — 77030008534 HC TBNG LIPOSUC BYRO -B: Performed by: SURGERY

## 2023-05-03 PROCEDURE — 2709999900 HC NON-CHARGEABLE SUPPLY: Performed by: SURGERY

## 2023-05-03 PROCEDURE — 74011000250 HC RX REV CODE- 250: Performed by: NURSE ANESTHETIST, CERTIFIED REGISTERED

## 2023-05-03 PROCEDURE — 76210000020 HC REC RM PH II FIRST 0.5 HR: Performed by: SURGERY

## 2023-05-03 PROCEDURE — 77030008463 HC STPLR SKN PROX J&J -B: Performed by: SURGERY

## 2023-05-03 PROCEDURE — 81025 URINE PREGNANCY TEST: CPT

## 2023-05-03 PROCEDURE — 76060000036 HC ANESTHESIA 2.5 TO 3 HR: Performed by: SURGERY

## 2023-05-03 PROCEDURE — 77030031139 HC SUT VCRL2 J&J -A: Performed by: SURGERY

## 2023-05-03 PROCEDURE — 74011000258 HC RX REV CODE- 258: Performed by: SURGERY

## 2023-05-03 PROCEDURE — 77030026438 HC STYL ET INTUB CARD -A: Performed by: NURSE ANESTHETIST, CERTIFIED REGISTERED

## 2023-05-03 PROCEDURE — 76010000132 HC OR TIME 2.5 TO 3 HR: Performed by: SURGERY

## 2023-05-03 PROCEDURE — 74011250636 HC RX REV CODE- 250/636: Performed by: NURSE ANESTHETIST, CERTIFIED REGISTERED

## 2023-05-03 PROCEDURE — 77030040506 HC DRN WND MDII -A: Performed by: SURGERY

## 2023-05-03 RX ORDER — DIPHENHYDRAMINE HYDROCHLORIDE 50 MG/ML
12.5 INJECTION, SOLUTION INTRAMUSCULAR; INTRAVENOUS AS NEEDED
Status: DISCONTINUED | OUTPATIENT
Start: 2023-05-03 | End: 2023-05-03 | Stop reason: HOSPADM

## 2023-05-03 RX ORDER — LIDOCAINE HYDROCHLORIDE 10 MG/ML
0.1 INJECTION, SOLUTION EPIDURAL; INFILTRATION; INTRACAUDAL; PERINEURAL AS NEEDED
Status: DISCONTINUED | OUTPATIENT
Start: 2023-05-03 | End: 2023-05-03 | Stop reason: HOSPADM

## 2023-05-03 RX ORDER — PROPOFOL 10 MG/ML
INJECTION, EMULSION INTRAVENOUS AS NEEDED
Status: DISCONTINUED | OUTPATIENT
Start: 2023-05-03 | End: 2023-05-03 | Stop reason: HOSPADM

## 2023-05-03 RX ORDER — PROPOFOL 10 MG/ML
INJECTION, EMULSION INTRAVENOUS
Status: DISCONTINUED | OUTPATIENT
Start: 2023-05-03 | End: 2023-05-03 | Stop reason: HOSPADM

## 2023-05-03 RX ORDER — HYDROMORPHONE HYDROCHLORIDE 2 MG/ML
INJECTION, SOLUTION INTRAMUSCULAR; INTRAVENOUS; SUBCUTANEOUS AS NEEDED
Status: DISCONTINUED | OUTPATIENT
Start: 2023-05-03 | End: 2023-05-03 | Stop reason: HOSPADM

## 2023-05-03 RX ORDER — ROCURONIUM BROMIDE 10 MG/ML
INJECTION, SOLUTION INTRAVENOUS AS NEEDED
Status: DISCONTINUED | OUTPATIENT
Start: 2023-05-03 | End: 2023-05-03 | Stop reason: HOSPADM

## 2023-05-03 RX ORDER — SODIUM CHLORIDE, SODIUM LACTATE, POTASSIUM CHLORIDE, CALCIUM CHLORIDE 600; 310; 30; 20 MG/100ML; MG/100ML; MG/100ML; MG/100ML
INJECTION, SOLUTION INTRAVENOUS
Status: DISCONTINUED | OUTPATIENT
Start: 2023-05-03 | End: 2023-05-03 | Stop reason: HOSPADM

## 2023-05-03 RX ORDER — HYDROMORPHONE HYDROCHLORIDE 1 MG/ML
.25-1 INJECTION, SOLUTION INTRAMUSCULAR; INTRAVENOUS; SUBCUTANEOUS
Status: DISCONTINUED | OUTPATIENT
Start: 2023-05-03 | End: 2023-05-03 | Stop reason: HOSPADM

## 2023-05-03 RX ORDER — DEXMEDETOMIDINE HYDROCHLORIDE 100 UG/ML
INJECTION, SOLUTION INTRAVENOUS AS NEEDED
Status: DISCONTINUED | OUTPATIENT
Start: 2023-05-03 | End: 2023-05-03 | Stop reason: HOSPADM

## 2023-05-03 RX ORDER — DEXAMETHASONE SODIUM PHOSPHATE 4 MG/ML
INJECTION, SOLUTION INTRA-ARTICULAR; INTRALESIONAL; INTRAMUSCULAR; INTRAVENOUS; SOFT TISSUE AS NEEDED
Status: DISCONTINUED | OUTPATIENT
Start: 2023-05-03 | End: 2023-05-03 | Stop reason: HOSPADM

## 2023-05-03 RX ORDER — LIDOCAINE HYDROCHLORIDE 20 MG/ML
INJECTION, SOLUTION EPIDURAL; INFILTRATION; INTRACAUDAL; PERINEURAL AS NEEDED
Status: DISCONTINUED | OUTPATIENT
Start: 2023-05-03 | End: 2023-05-03 | Stop reason: HOSPADM

## 2023-05-03 RX ORDER — FAMOTIDINE 10 MG/ML
INJECTION INTRAVENOUS AS NEEDED
Status: DISCONTINUED | OUTPATIENT
Start: 2023-05-03 | End: 2023-05-03 | Stop reason: HOSPADM

## 2023-05-03 RX ORDER — ONDANSETRON 2 MG/ML
INJECTION INTRAMUSCULAR; INTRAVENOUS AS NEEDED
Status: DISCONTINUED | OUTPATIENT
Start: 2023-05-03 | End: 2023-05-03 | Stop reason: HOSPADM

## 2023-05-03 RX ORDER — MIDAZOLAM HYDROCHLORIDE 1 MG/ML
INJECTION, SOLUTION INTRAMUSCULAR; INTRAVENOUS AS NEEDED
Status: DISCONTINUED | OUTPATIENT
Start: 2023-05-03 | End: 2023-05-03 | Stop reason: HOSPADM

## 2023-05-03 RX ADMIN — DEXMEDETOMIDINE 4 MCG: 100 INJECTION, SOLUTION INTRAVENOUS at 12:19

## 2023-05-03 RX ADMIN — SODIUM CHLORIDE, POTASSIUM CHLORIDE, SODIUM LACTATE AND CALCIUM CHLORIDE: 600; 310; 30; 20 INJECTION, SOLUTION INTRAVENOUS at 11:45

## 2023-05-03 RX ADMIN — SUGAMMADEX 200 MG: 100 INJECTION, SOLUTION INTRAVENOUS at 13:50

## 2023-05-03 RX ADMIN — HYDROMORPHONE HYDROCHLORIDE 1 MG: 2 INJECTION, SOLUTION INTRAMUSCULAR; INTRAVENOUS; SUBCUTANEOUS at 14:07

## 2023-05-03 RX ADMIN — DEXAMETHASONE SODIUM PHOSPHATE 8 MG: 4 INJECTION, SOLUTION INTRAMUSCULAR; INTRAVENOUS at 12:07

## 2023-05-03 RX ADMIN — PROPOFOL 50 MG: 10 INJECTION, EMULSION INTRAVENOUS at 14:07

## 2023-05-03 RX ADMIN — DEXMEDETOMIDINE 4 MCG: 100 INJECTION, SOLUTION INTRAVENOUS at 11:59

## 2023-05-03 RX ADMIN — ROCURONIUM BROMIDE 50 MG: 10 INJECTION INTRAVENOUS at 11:58

## 2023-05-03 RX ADMIN — ROCURONIUM BROMIDE 20 MG: 10 INJECTION INTRAVENOUS at 13:01

## 2023-05-03 RX ADMIN — FAMOTIDINE 20 MG: 10 INJECTION INTRAVENOUS at 11:45

## 2023-05-03 RX ADMIN — SODIUM CHLORIDE, POTASSIUM CHLORIDE, SODIUM LACTATE AND CALCIUM CHLORIDE: 600; 310; 30; 20 INJECTION, SOLUTION INTRAVENOUS at 14:11

## 2023-05-03 RX ADMIN — MIDAZOLAM HYDROCHLORIDE 2 MG: 1 INJECTION, SOLUTION INTRAMUSCULAR; INTRAVENOUS at 11:45

## 2023-05-03 RX ADMIN — PROPOFOL 200 MG: 10 INJECTION, EMULSION INTRAVENOUS at 11:57

## 2023-05-03 RX ADMIN — DEXMEDETOMIDINE 4 MCG: 100 INJECTION, SOLUTION INTRAVENOUS at 11:51

## 2023-05-03 RX ADMIN — DEXMEDETOMIDINE 4 MCG: 100 INJECTION, SOLUTION INTRAVENOUS at 12:12

## 2023-05-03 RX ADMIN — LIDOCAINE HYDROCHLORIDE 100 MG: 20 INJECTION, SOLUTION EPIDURAL; INFILTRATION; INTRACAUDAL; PERINEURAL at 11:57

## 2023-05-03 RX ADMIN — ONDANSETRON HYDROCHLORIDE 4 MG: 2 SOLUTION INTRAMUSCULAR; INTRAVENOUS at 14:02

## 2023-05-03 RX ADMIN — CEFAZOLIN SODIUM 2 G: 1 POWDER, FOR SOLUTION INTRAMUSCULAR; INTRAVENOUS at 12:06

## 2023-05-03 RX ADMIN — HYDROMORPHONE HYDROCHLORIDE 1 MG: 2 INJECTION, SOLUTION INTRAMUSCULAR; INTRAVENOUS; SUBCUTANEOUS at 11:45

## 2023-05-03 RX ADMIN — HYDROMORPHONE HYDROCHLORIDE 0.5 MG: 1 INJECTION, SOLUTION INTRAMUSCULAR; INTRAVENOUS; SUBCUTANEOUS at 15:17

## 2023-05-03 RX ADMIN — DEXMEDETOMIDINE 4 MCG: 100 INJECTION, SOLUTION INTRAVENOUS at 12:05

## 2023-05-03 RX ADMIN — PROPOFOL 150 MCG/KG/MIN: 10 INJECTION, EMULSION INTRAVENOUS at 12:02

## 2023-05-03 RX ADMIN — HYDROMORPHONE HYDROCHLORIDE 0.5 MG: 1 INJECTION, SOLUTION INTRAMUSCULAR; INTRAVENOUS; SUBCUTANEOUS at 14:32

## 2024-12-09 ENCOUNTER — HOSPITAL ENCOUNTER (OUTPATIENT)
Facility: HOSPITAL | Age: 54
Discharge: HOME | End: 2024-12-12
Payer: COMMERCIAL

## 2024-12-09 VITALS
SYSTOLIC BLOOD PRESSURE: 133 MMHG | HEART RATE: 85 BPM | BODY MASS INDEX: 43.04 KG/M2 | WEIGHT: 242.9 LBS | HEIGHT: 63 IN | RESPIRATION RATE: 16 BRPM | OXYGEN SATURATION: 98 % | TEMPERATURE: 98.1 F | DIASTOLIC BLOOD PRESSURE: 89 MMHG

## 2024-12-09 LAB
ALBUMIN SERPL-MCNC: 3.5 G/DL (ref 3.5–5)
ALBUMIN/GLOB SERPL: 1.1 (ref 1.1–2.2)
ALP SERPL-CCNC: 115 U/L (ref 45–117)
ALT SERPL-CCNC: 31 U/L (ref 12–78)
ANION GAP SERPL CALC-SCNC: 3 MMOL/L (ref 2–12)
APPEARANCE UR: CLEAR
AST SERPL-CCNC: 31 U/L (ref 15–37)
BACTERIA URNS QL MICRO: NEGATIVE /HPF
BASOPHILS # BLD: 0 K/UL (ref 0–0.1)
BASOPHILS NFR BLD: 1 % (ref 0–1)
BILIRUB SERPL-MCNC: 0.5 MG/DL (ref 0.2–1)
BILIRUB UR QL: NEGATIVE
BUN SERPL-MCNC: 14 MG/DL (ref 6–20)
BUN/CREAT SERPL: 16 (ref 12–20)
CALCIUM SERPL-MCNC: 9.2 MG/DL (ref 8.5–10.1)
CHLORIDE SERPL-SCNC: 110 MMOL/L (ref 97–108)
CO2 SERPL-SCNC: 28 MMOL/L (ref 21–32)
COLOR UR: NORMAL
CREAT SERPL-MCNC: 0.85 MG/DL (ref 0.55–1.02)
CRP SERPL-MCNC: 0.72 MG/DL (ref 0–0.3)
DIFFERENTIAL METHOD BLD: ABNORMAL
EOSINOPHIL # BLD: 0.1 K/UL (ref 0–0.4)
EOSINOPHIL NFR BLD: 2 % (ref 0–7)
EPITH CASTS URNS QL MICRO: NORMAL /LPF
ERYTHROCYTE [DISTWIDTH] IN BLOOD BY AUTOMATED COUNT: 13.4 % (ref 11.5–14.5)
ERYTHROCYTE [SEDIMENTATION RATE] IN BLOOD: 20 MM/HR (ref 0–30)
EST. AVERAGE GLUCOSE BLD GHB EST-MCNC: 117 MG/DL
GLOBULIN SER CALC-MCNC: 3.3 G/DL (ref 2–4)
GLUCOSE SERPL-MCNC: 84 MG/DL (ref 65–100)
GLUCOSE UR STRIP.AUTO-MCNC: NEGATIVE MG/DL
HBA1C MFR BLD: 5.7 % (ref 4–5.6)
HCT VFR BLD AUTO: 39.7 % (ref 35–47)
HGB BLD-MCNC: 13 G/DL (ref 11.5–16)
HGB UR QL STRIP: NEGATIVE
HYALINE CASTS URNS QL MICRO: NORMAL /LPF (ref 0–2)
IMM GRANULOCYTES # BLD AUTO: 0 K/UL (ref 0–0.04)
IMM GRANULOCYTES NFR BLD AUTO: 1 % (ref 0–0.5)
KETONES UR QL STRIP.AUTO: NEGATIVE MG/DL
LEUKOCYTE ESTERASE UR QL STRIP.AUTO: NEGATIVE
LYMPHOCYTES # BLD: 1.7 K/UL (ref 0.8–3.5)
LYMPHOCYTES NFR BLD: 34 % (ref 12–49)
MCH RBC QN AUTO: 29.1 PG (ref 26–34)
MCHC RBC AUTO-ENTMCNC: 32.7 G/DL (ref 30–36.5)
MCV RBC AUTO: 88.8 FL (ref 80–99)
MONOCYTES # BLD: 0.5 K/UL (ref 0–1)
MONOCYTES NFR BLD: 10 % (ref 5–13)
NEUTS SEG # BLD: 2.7 K/UL (ref 1.8–8)
NEUTS SEG NFR BLD: 52 % (ref 32–75)
NITRITE UR QL STRIP.AUTO: NEGATIVE
NRBC # BLD: 0 K/UL (ref 0–0.01)
NRBC BLD-RTO: 0 PER 100 WBC
PH UR STRIP: 5.5 (ref 5–8)
PLATELET # BLD AUTO: 296 K/UL (ref 150–400)
PMV BLD AUTO: 10.7 FL (ref 8.9–12.9)
POTASSIUM SERPL-SCNC: 4.1 MMOL/L (ref 3.5–5.1)
PROT SERPL-MCNC: 6.8 G/DL (ref 6.4–8.2)
PROT UR STRIP-MCNC: NEGATIVE MG/DL
RBC # BLD AUTO: 4.47 M/UL (ref 3.8–5.2)
RBC #/AREA URNS HPF: NORMAL /HPF (ref 0–5)
SODIUM SERPL-SCNC: 141 MMOL/L (ref 136–145)
SP GR UR REFRACTOMETRY: 1.01 (ref 1–1.03)
URINE CULTURE IF INDICATED: NORMAL
UROBILINOGEN UR QL STRIP.AUTO: 0.2 EU/DL (ref 0.2–1)
WBC # BLD AUTO: 5.1 K/UL (ref 3.6–11)
WBC URNS QL MICRO: NORMAL /HPF (ref 0–4)

## 2024-12-09 PROCEDURE — 85025 COMPLETE CBC W/AUTO DIFF WBC: CPT

## 2024-12-09 PROCEDURE — 93005 ELECTROCARDIOGRAM TRACING: CPT | Performed by: ORTHOPAEDIC SURGERY

## 2024-12-09 PROCEDURE — 80053 COMPREHEN METABOLIC PANEL: CPT

## 2024-12-09 PROCEDURE — 84466 ASSAY OF TRANSFERRIN: CPT

## 2024-12-09 PROCEDURE — 86140 C-REACTIVE PROTEIN: CPT

## 2024-12-09 PROCEDURE — 83036 HEMOGLOBIN GLYCOSYLATED A1C: CPT

## 2024-12-09 PROCEDURE — 85652 RBC SED RATE AUTOMATED: CPT

## 2024-12-09 PROCEDURE — 36415 COLL VENOUS BLD VENIPUNCTURE: CPT

## 2024-12-09 PROCEDURE — 81001 URINALYSIS AUTO W/SCOPE: CPT

## 2024-12-09 ASSESSMENT — PAIN DESCRIPTION - PAIN TYPE: TYPE: CHRONIC PAIN

## 2024-12-09 ASSESSMENT — PAIN DESCRIPTION - DESCRIPTORS: DESCRIPTORS: ACHING

## 2024-12-09 ASSESSMENT — PAIN DESCRIPTION - ORIENTATION: ORIENTATION: RIGHT

## 2024-12-09 ASSESSMENT — PAIN DESCRIPTION - LOCATION: LOCATION: BACK;KNEE

## 2024-12-09 ASSESSMENT — PAIN SCALES - GENERAL: PAINLEVEL_OUTOF10: 7

## 2024-12-09 NOTE — PERIOP NOTE
Abnormal CRP lab result from today's pre-admission testing appointment sent electronically to pt's PCP and to Dr. Corado.  Date of upcoming planned surgery 12/19/2024.

## 2024-12-09 NOTE — DISCHARGE INSTRUCTIONS
[facility]                   [address] [place] [address]   MAIN OR                                  [phone]   MAIN PRE OP                          [phone]                                                                                AMBULATORY PRE OP          [phone]  PRE-ADMISSION TESTING    [phone]     Surgery Date:  Thursday 12/19/2024       Is surgery arrival time given by surgeon?  NO  If “NO”, [facility] staff will call you between 3 and 7pm the day before your surgery with your arrival time. (If your surgery is on a Monday, we will call you the Friday before.)    Call [phone] after 7pm Monday-Friday if you did not receive this call.    INSTRUCTIONS BEFORE YOUR SURGERY   When You  Arrive Arrive at the 2nd Floor Admitting Desk on the day of your surgery  Have your insurance card, photo ID, and any copayment (if needed)   Food   and   Drink    No food or drink (gum, mints, coffee, juice, etc) after midnight the night before surgery.   EXCEPT:  You may drink WATER ONLY up until 2 hours prior to your surgery time. Please do not add anything to your water as this may result in your surgery being postponed.        No marijuana or alcohol (beer, wine, liquor) 24 hours before or after surgery.    Medications to   TAKE   Morning of Surgery MEDICATIONS TO TAKE THE MORNING OF SURGERY WITH A SIP OF WATER:    Bentyl if needed   Medications  To  STOP      7 days before surgery Non-Steroidal anti-inflammatory Drugs (NSAID's): for example, Ibuprofen (Advil, Motrin), Naproxen (Aleve)  Aspirin, if taking for pain   Herbal supplements, vitamins, and fish oil  Other:  (Pain medications not listed above, including Tylenol may be taken)   Blood  Thinners If you take  Aspirin, Plavix, Coumadin, or any blood-thinning or anti-blood clot medicine, talk to the doctor who prescribed the medications for pre-operative instructions.   Bathing

## 2024-12-10 LAB
BACTERIA SPEC CULT: NORMAL
BACTERIA SPEC CULT: NORMAL
SERVICE CMNT-IMP: NORMAL
TRANSFERRIN SERPL-MCNC: 205 MG/DL (ref 192–364)

## 2024-12-12 LAB
EKG ATRIAL RATE: 70 BPM
EKG DIAGNOSIS: NORMAL
EKG P AXIS: 58 DEGREES
EKG P-R INTERVAL: 178 MS
EKG Q-T INTERVAL: 392 MS
EKG QRS DURATION: 72 MS
EKG QTC CALCULATION (BAZETT): 423 MS
EKG R AXIS: 53 DEGREES
EKG T AXIS: 47 DEGREES
EKG VENTRICULAR RATE: 70 BPM

## 2024-12-16 ENCOUNTER — ANESTHESIA EVENT (OUTPATIENT)
Facility: HOSPITAL | Age: 54
End: 2024-12-16
Payer: COMMERCIAL

## 2024-12-18 NOTE — ANESTHESIA PRE PROCEDURE
Department of Anesthesiology  Preprocedure Note       Name:  Jonh Solomon   Age:  54 y.o.  :  1970                                          MRN:  [id number]         Date:  2024      Surgeon: Surgeon(s):  Lucio Corado MD    Procedure: Procedure(s):  RIGHT TOTAL KNEE ARTHROPLASTY (BENI)    Medications prior to admission:   Prior to Admission medications    Medication Sig Start Date End Date Taking? Authorizing Provider   Dicyclomine HCl (BENTYL PO) Take 5 mg by mouth as needed (abdominal spasm) From scarring from abdominoplasty    Provider, MD Melissa       Current medications:    No current facility-administered medications for this encounter.     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    Chest pain R07.9    Intractable nausea and vomiting R11.2    Dizziness R42    Nausea R11.0       Past Medical History:        Diagnosis Date    Arthritis     Coagulation disorder (HCC)     sickle cell trait- not disease    COVID-19     GERD (gastroesophageal reflux disease)     History of blood transfusion     after , at [facility] in [facility]    [place] (hard of hearing)     rt ear    Ill-defined condition     Sarcoidosis, FIRST DX     Pericarditis, acute     POST COVID    Sarcoidosis        Past Surgical History:        Procedure Laterality Date    ABDOMINOPLASTY      BREAST REDUCTION SURGERY Bilateral 5/3/2023    BILATERAL BREAST REDUCTION performed by Kailash Parada MD at [facility] MAIN OR    CARDIAC CATHETERIZATION

## 2024-12-19 ENCOUNTER — APPOINTMENT (OUTPATIENT)
Facility: HOSPITAL | Age: 54
End: 2024-12-19
Attending: ORTHOPAEDIC SURGERY
Payer: COMMERCIAL

## 2024-12-19 ENCOUNTER — ANESTHESIA (OUTPATIENT)
Facility: HOSPITAL | Age: 54
End: 2024-12-19
Payer: COMMERCIAL

## 2024-12-19 ENCOUNTER — HOSPITAL ENCOUNTER (OUTPATIENT)
Facility: HOSPITAL | Age: 54
Setting detail: OBSERVATION
Discharge: HOME | End: 2024-12-19
Attending: ORTHOPAEDIC SURGERY | Admitting: ORTHOPAEDIC SURGERY
Payer: COMMERCIAL

## 2024-12-19 VITALS
HEART RATE: 78 BPM | BODY MASS INDEX: 43.12 KG/M2 | OXYGEN SATURATION: 99 % | DIASTOLIC BLOOD PRESSURE: 57 MMHG | RESPIRATION RATE: 15 BRPM | TEMPERATURE: 97.8 F | SYSTOLIC BLOOD PRESSURE: 103 MMHG | HEIGHT: 63 IN | WEIGHT: 243.39 LBS

## 2024-12-19 DIAGNOSIS — M17.11 ARTHRITIS OF RIGHT KNEE: Primary | ICD-10-CM

## 2024-12-19 LAB — HCG UR QL: NEGATIVE

## 2024-12-19 PROCEDURE — 97161 PT EVAL LOW COMPLEX 20 MIN: CPT

## 2024-12-19 PROCEDURE — 7100000001 HC PACU RECOVERY - ADDTL 15 MIN: Performed by: ORTHOPAEDIC SURGERY

## 2024-12-19 PROCEDURE — 6370000000 HC RX 637 (ALT 250 FOR IP): Performed by: ORTHOPAEDIC SURGERY

## 2024-12-19 PROCEDURE — 2580000003 HC RX 258: Performed by: ANESTHESIOLOGY

## 2024-12-19 PROCEDURE — 6360000002 HC RX W HCPCS

## 2024-12-19 PROCEDURE — G0378 HOSPITAL OBSERVATION PER HR: HCPCS

## 2024-12-19 PROCEDURE — 2500000003 HC RX 250 WO HCPCS: Performed by: ORTHOPAEDIC SURGERY

## 2024-12-19 PROCEDURE — 6360000002 HC RX W HCPCS: Performed by: ANESTHESIOLOGY

## 2024-12-19 PROCEDURE — 73560 X-RAY EXAM OF KNEE 1 OR 2: CPT

## 2024-12-19 PROCEDURE — 3600000005 HC SURGERY LEVEL 5 BASE: Performed by: ORTHOPAEDIC SURGERY

## 2024-12-19 PROCEDURE — 7100000000 HC PACU RECOVERY - FIRST 15 MIN: Performed by: ORTHOPAEDIC SURGERY

## 2024-12-19 PROCEDURE — 3600000015 HC SURGERY LEVEL 5 ADDTL 15MIN: Performed by: ORTHOPAEDIC SURGERY

## 2024-12-19 PROCEDURE — 6370000000 HC RX 637 (ALT 250 FOR IP): Performed by: ANESTHESIOLOGY

## 2024-12-19 PROCEDURE — 64999 UNLISTED PX NERVOUS SYSTEM: CPT | Performed by: ANESTHESIOLOGY

## 2024-12-19 PROCEDURE — C1776 JOINT DEVICE (IMPLANTABLE): HCPCS | Performed by: ORTHOPAEDIC SURGERY

## 2024-12-19 PROCEDURE — 97530 THERAPEUTIC ACTIVITIES: CPT

## 2024-12-19 PROCEDURE — 6370000000 HC RX 637 (ALT 250 FOR IP): Performed by: PHYSICIAN ASSISTANT

## 2024-12-19 PROCEDURE — 7100000010 HC PHASE II RECOVERY - FIRST 15 MIN: Performed by: ORTHOPAEDIC SURGERY

## 2024-12-19 PROCEDURE — 3700000000 HC ANESTHESIA ATTENDED CARE: Performed by: ORTHOPAEDIC SURGERY

## 2024-12-19 PROCEDURE — 7100000011 HC PHASE II RECOVERY - ADDTL 15 MIN: Performed by: ORTHOPAEDIC SURGERY

## 2024-12-19 PROCEDURE — 97116 GAIT TRAINING THERAPY: CPT

## 2024-12-19 PROCEDURE — 2500000003 HC RX 250 WO HCPCS

## 2024-12-19 PROCEDURE — 2580000003 HC RX 258

## 2024-12-19 PROCEDURE — 2720000010 HC SURG SUPPLY STERILE: Performed by: ORTHOPAEDIC SURGERY

## 2024-12-19 PROCEDURE — 3700000001 HC ADD 15 MINUTES (ANESTHESIA): Performed by: ORTHOPAEDIC SURGERY

## 2024-12-19 PROCEDURE — 6360000002 HC RX W HCPCS: Performed by: ORTHOPAEDIC SURGERY

## 2024-12-19 PROCEDURE — 2709999900 HC NON-CHARGEABLE SUPPLY: Performed by: ORTHOPAEDIC SURGERY

## 2024-12-19 PROCEDURE — 81025 URINE PREGNANCY TEST: CPT

## 2024-12-19 DEVICE — TIBIAL COMPONENT
Type: IMPLANTABLE DEVICE | Site: KNEE | Status: FUNCTIONAL
Brand: TRIATHLON

## 2024-12-19 DEVICE — COMPONENT TOT KNEE CAPPED PRIMARY K2STRYKER] STRYKER CORP]: Type: IMPLANTABLE DEVICE | Site: KNEE | Status: FUNCTIONAL

## 2024-12-19 DEVICE — CRUCIATE RETAINING FEMORAL
Type: IMPLANTABLE DEVICE | Site: KNEE | Status: FUNCTIONAL
Brand: TRIATHLON

## 2024-12-19 DEVICE — IMPLANTABLE DEVICE: Type: IMPLANTABLE DEVICE | Site: KNEE | Status: FUNCTIONAL

## 2024-12-19 DEVICE — COMPONENT PAT DIA29MM THK9MM SUP INFERIOR KNEE TRITANIUM: Type: IMPLANTABLE DEVICE | Site: KNEE | Status: FUNCTIONAL

## 2024-12-19 RX ORDER — DEXMEDETOMIDINE HYDROCHLORIDE 100 UG/ML
INJECTION, SOLUTION INTRAVENOUS
Status: DISCONTINUED | OUTPATIENT
Start: 2024-12-19 | End: 2024-12-19 | Stop reason: SDUPTHER

## 2024-12-19 RX ORDER — HYDROMORPHONE HYDROCHLORIDE 1 MG/ML
1 INJECTION, SOLUTION INTRAMUSCULAR; INTRAVENOUS; SUBCUTANEOUS
Status: CANCELLED | OUTPATIENT
Start: 2024-12-19

## 2024-12-19 RX ORDER — OXYCODONE HYDROCHLORIDE 5 MG/1
10 TABLET ORAL
Status: DISCONTINUED | OUTPATIENT
Start: 2024-12-19 | End: 2024-12-19 | Stop reason: HOSPADM

## 2024-12-19 RX ORDER — FENTANYL CITRATE 50 UG/ML
25 INJECTION, SOLUTION INTRAMUSCULAR; INTRAVENOUS EVERY 5 MIN PRN
Status: DISCONTINUED | OUTPATIENT
Start: 2024-12-19 | End: 2024-12-19 | Stop reason: HOSPADM

## 2024-12-19 RX ORDER — ACETAMINOPHEN 325 MG/1
975 TABLET ORAL ONCE
Status: DISCONTINUED | OUTPATIENT
Start: 2024-12-19 | End: 2024-12-19 | Stop reason: HOSPADM

## 2024-12-19 RX ORDER — PREGABALIN 75 MG/1
75 CAPSULE ORAL
Status: CANCELLED | OUTPATIENT
Start: 2024-12-19

## 2024-12-19 RX ORDER — NALOXONE HYDROCHLORIDE 0.4 MG/ML
INJECTION, SOLUTION INTRAMUSCULAR; INTRAVENOUS; SUBCUTANEOUS PRN
Status: DISCONTINUED | OUTPATIENT
Start: 2024-12-19 | End: 2024-12-19 | Stop reason: HOSPADM

## 2024-12-19 RX ORDER — ACETAMINOPHEN 325 MG/1
975 TABLET ORAL ONCE
Status: COMPLETED | OUTPATIENT
Start: 2024-12-19 | End: 2024-12-19

## 2024-12-19 RX ORDER — PREGABALIN 50 MG/1
50 CAPSULE ORAL
Qty: 60 CAPSULE | Refills: 0 | Status: SHIPPED | OUTPATIENT
Start: 2024-12-19 | End: 2025-02-17

## 2024-12-19 RX ORDER — SODIUM CHLORIDE 0.9 % (FLUSH) 0.9 %
5-40 SYRINGE (ML) INJECTION EVERY 12 HOURS SCHEDULED
Status: CANCELLED | OUTPATIENT
Start: 2024-12-19

## 2024-12-19 RX ORDER — FAMOTIDINE 20 MG/1
20 TABLET, FILM COATED ORAL 2 TIMES DAILY
Status: CANCELLED | OUTPATIENT
Start: 2024-12-19

## 2024-12-19 RX ORDER — KETOROLAC TROMETHAMINE 15 MG/ML
15 INJECTION, SOLUTION INTRAMUSCULAR; INTRAVENOUS
Status: DISCONTINUED | OUTPATIENT
Start: 2024-12-19 | End: 2024-12-19 | Stop reason: HOSPADM

## 2024-12-19 RX ORDER — FENTANYL CITRATE 50 UG/ML
INJECTION, SOLUTION INTRAMUSCULAR; INTRAVENOUS
Status: DISCONTINUED | OUTPATIENT
Start: 2024-12-19 | End: 2024-12-19 | Stop reason: SDUPTHER

## 2024-12-19 RX ORDER — SODIUM CHLORIDE 9 MG/ML
INJECTION, SOLUTION INTRAVENOUS CONTINUOUS
Status: CANCELLED | OUTPATIENT
Start: 2024-12-19

## 2024-12-19 RX ORDER — DIPHENHYDRAMINE HYDROCHLORIDE 50 MG/ML
12.5 INJECTION INTRAMUSCULAR; INTRAVENOUS
Status: DISCONTINUED | OUTPATIENT
Start: 2024-12-19 | End: 2024-12-19 | Stop reason: HOSPADM

## 2024-12-19 RX ORDER — SODIUM CHLORIDE, SODIUM LACTATE, POTASSIUM CHLORIDE, CALCIUM CHLORIDE 600; 310; 30; 20 MG/100ML; MG/100ML; MG/100ML; MG/100ML
INJECTION, SOLUTION INTRAVENOUS
Status: DISCONTINUED | OUTPATIENT
Start: 2024-12-19 | End: 2024-12-19 | Stop reason: SDUPTHER

## 2024-12-19 RX ORDER — OXYCODONE HCL 10 MG/1
10 TABLET, FILM COATED, EXTENDED RELEASE ORAL ONCE
Status: COMPLETED | OUTPATIENT
Start: 2024-12-19 | End: 2024-12-19

## 2024-12-19 RX ORDER — ASPIRIN 81 MG/1
81 TABLET ORAL 2 TIMES DAILY
Status: CANCELLED | OUTPATIENT
Start: 2024-12-19

## 2024-12-19 RX ORDER — ACETAMINOPHEN 325 MG/1
650 TABLET ORAL EVERY 4 HOURS
Qty: 120 TABLET | Refills: 1 | Status: SHIPPED | OUTPATIENT
Start: 2024-12-19 | End: 2025-01-18

## 2024-12-19 RX ORDER — MIDAZOLAM HYDROCHLORIDE 2 MG/2ML
2 INJECTION, SOLUTION INTRAMUSCULAR; INTRAVENOUS
Status: DISCONTINUED | OUTPATIENT
Start: 2024-12-19 | End: 2024-12-19 | Stop reason: HOSPADM

## 2024-12-19 RX ORDER — SODIUM CHLORIDE 9 MG/ML
INJECTION, SOLUTION INTRAVENOUS CONTINUOUS
Status: DISCONTINUED | OUTPATIENT
Start: 2024-12-19 | End: 2024-12-19 | Stop reason: HOSPADM

## 2024-12-19 RX ORDER — MIDAZOLAM HYDROCHLORIDE 1 MG/ML
INJECTION, SOLUTION INTRAMUSCULAR; INTRAVENOUS
Status: DISCONTINUED | OUTPATIENT
Start: 2024-12-19 | End: 2024-12-19 | Stop reason: SDUPTHER

## 2024-12-19 RX ORDER — PREGABALIN 75 MG/1
75 CAPSULE ORAL ONCE
Status: COMPLETED | OUTPATIENT
Start: 2024-12-19 | End: 2024-12-19

## 2024-12-19 RX ORDER — OXYCODONE HCL 10 MG/1
20 TABLET, FILM COATED, EXTENDED RELEASE ORAL ONCE
Status: DISCONTINUED | OUTPATIENT
Start: 2024-12-19 | End: 2024-12-19 | Stop reason: HOSPADM

## 2024-12-19 RX ORDER — LIDOCAINE HYDROCHLORIDE 20 MG/ML
INJECTION, SOLUTION EPIDURAL; INFILTRATION; INTRACAUDAL; PERINEURAL
Status: DISCONTINUED | OUTPATIENT
Start: 2024-12-19 | End: 2024-12-19 | Stop reason: SDUPTHER

## 2024-12-19 RX ORDER — EPHEDRINE SULFATE 50 MG/ML
INJECTION INTRAVENOUS
Status: DISCONTINUED | OUTPATIENT
Start: 2024-12-19 | End: 2024-12-19 | Stop reason: SDUPTHER

## 2024-12-19 RX ORDER — BUPIVACAINE HYDROCHLORIDE 5 MG/ML
INJECTION, SOLUTION EPIDURAL; INTRACAUDAL
Status: DISCONTINUED | OUTPATIENT
Start: 2024-12-19 | End: 2024-12-19 | Stop reason: SDUPTHER

## 2024-12-19 RX ORDER — ASPIRIN 81 MG/1
81 TABLET ORAL 2 TIMES DAILY
Qty: 60 TABLET | Refills: 3 | Status: SHIPPED | OUTPATIENT
Start: 2024-12-19

## 2024-12-19 RX ORDER — ONDANSETRON 2 MG/ML
INJECTION INTRAMUSCULAR; INTRAVENOUS
Status: DISCONTINUED | OUTPATIENT
Start: 2024-12-19 | End: 2024-12-19 | Stop reason: SDUPTHER

## 2024-12-19 RX ORDER — BISACODYL 5 MG/1
5 TABLET, DELAYED RELEASE ORAL DAILY PRN
Status: CANCELLED | OUTPATIENT
Start: 2024-12-19

## 2024-12-19 RX ORDER — ONDANSETRON 4 MG/1
4 TABLET, ORALLY DISINTEGRATING ORAL EVERY 8 HOURS PRN
Status: CANCELLED | OUTPATIENT
Start: 2024-12-19

## 2024-12-19 RX ORDER — PHENYLEPHRINE HCL IN 0.9% NACL 0.4MG/10ML
SYRINGE (ML) INTRAVENOUS
Status: DISCONTINUED | OUTPATIENT
Start: 2024-12-19 | End: 2024-12-19 | Stop reason: SDUPTHER

## 2024-12-19 RX ORDER — LIDOCAINE HYDROCHLORIDE 10 MG/ML
1 INJECTION, SOLUTION EPIDURAL; INFILTRATION; INTRACAUDAL; PERINEURAL
Status: DISCONTINUED | OUTPATIENT
Start: 2024-12-19 | End: 2024-12-19 | Stop reason: HOSPADM

## 2024-12-19 RX ORDER — PROPOFOL 10 MG/ML
INJECTION, EMULSION INTRAVENOUS
Status: DISCONTINUED | OUTPATIENT
Start: 2024-12-19 | End: 2024-12-19 | Stop reason: SDUPTHER

## 2024-12-19 RX ORDER — ONDANSETRON 2 MG/ML
4 INJECTION INTRAMUSCULAR; INTRAVENOUS
Status: DISCONTINUED | OUTPATIENT
Start: 2024-12-19 | End: 2024-12-19 | Stop reason: HOSPADM

## 2024-12-19 RX ORDER — BISACODYL 10 MG
10 SUPPOSITORY, RECTAL RECTAL DAILY PRN
Status: CANCELLED | OUTPATIENT
Start: 2024-12-19

## 2024-12-19 RX ORDER — SODIUM CHLORIDE 9 MG/ML
INJECTION, SOLUTION INTRAVENOUS PRN
Status: CANCELLED | OUTPATIENT
Start: 2024-12-19

## 2024-12-19 RX ORDER — POLYETHYLENE GLYCOL 3350 17 G/17G
17 POWDER, FOR SOLUTION ORAL DAILY
Status: CANCELLED | OUTPATIENT
Start: 2024-12-19

## 2024-12-19 RX ORDER — ROPIVACAINE HYDROCHLORIDE 2 MG/ML
INJECTION, SOLUTION EPIDURAL; INFILTRATION; PERINEURAL
Status: DISCONTINUED | OUTPATIENT
Start: 2024-12-19 | End: 2024-12-19 | Stop reason: SDUPTHER

## 2024-12-19 RX ORDER — OXYCODONE HYDROCHLORIDE 5 MG/1
5 TABLET ORAL
Status: DISCONTINUED | OUTPATIENT
Start: 2024-12-19 | End: 2024-12-19 | Stop reason: HOSPADM

## 2024-12-19 RX ORDER — DIPHENHYDRAMINE HYDROCHLORIDE 50 MG/ML
25 INJECTION INTRAMUSCULAR; INTRAVENOUS EVERY 6 HOURS PRN
Status: CANCELLED | OUTPATIENT
Start: 2024-12-19

## 2024-12-19 RX ORDER — FENTANYL CITRATE 50 UG/ML
100 INJECTION, SOLUTION INTRAMUSCULAR; INTRAVENOUS
Status: DISCONTINUED | OUTPATIENT
Start: 2024-12-19 | End: 2024-12-19 | Stop reason: HOSPADM

## 2024-12-19 RX ORDER — SODIUM CHLORIDE, SODIUM LACTATE, POTASSIUM CHLORIDE, CALCIUM CHLORIDE 600; 310; 30; 20 MG/100ML; MG/100ML; MG/100ML; MG/100ML
INJECTION, SOLUTION INTRAVENOUS CONTINUOUS
Status: DISCONTINUED | OUTPATIENT
Start: 2024-12-19 | End: 2024-12-19 | Stop reason: HOSPADM

## 2024-12-19 RX ORDER — SODIUM CHLORIDE 0.9 % (FLUSH) 0.9 %
5-40 SYRINGE (ML) INJECTION PRN
Status: CANCELLED | OUTPATIENT
Start: 2024-12-19

## 2024-12-19 RX ORDER — ACETAMINOPHEN 325 MG/1
650 TABLET ORAL EVERY 6 HOURS
Status: CANCELLED | OUTPATIENT
Start: 2024-12-19

## 2024-12-19 RX ORDER — ONDANSETRON 2 MG/ML
4 INJECTION INTRAMUSCULAR; INTRAVENOUS EVERY 6 HOURS PRN
Status: CANCELLED | OUTPATIENT
Start: 2024-12-19

## 2024-12-19 RX ORDER — TRANEXAMIC ACID 100 MG/ML
INJECTION, SOLUTION INTRAVENOUS
Status: DISCONTINUED | OUTPATIENT
Start: 2024-12-19 | End: 2024-12-19 | Stop reason: SDUPTHER

## 2024-12-19 RX ORDER — IBUPROFEN 800 MG/1
800 TABLET, FILM COATED ORAL EVERY 8 HOURS PRN
Qty: 60 TABLET | Refills: 0 | Status: SHIPPED | OUTPATIENT
Start: 2024-12-19

## 2024-12-19 RX ORDER — DIPHENHYDRAMINE HCL 25 MG
25 CAPSULE ORAL EVERY 6 HOURS PRN
Status: CANCELLED | OUTPATIENT
Start: 2024-12-19

## 2024-12-19 RX ORDER — ONDANSETRON 4 MG/1
4 TABLET, ORALLY DISINTEGRATING ORAL EVERY 8 HOURS PRN
Qty: 10 TABLET | Refills: 0 | Status: SHIPPED | OUTPATIENT
Start: 2024-12-19

## 2024-12-19 RX ORDER — TRAMADOL HYDROCHLORIDE 50 MG/1
50 TABLET ORAL EVERY 6 HOURS PRN
Qty: 30 TABLET | Refills: 0 | Status: SHIPPED | OUTPATIENT
Start: 2024-12-19 | End: 2024-12-26

## 2024-12-19 RX ORDER — KETOROLAC TROMETHAMINE 30 MG/ML
30 INJECTION, SOLUTION INTRAMUSCULAR; INTRAVENOUS EVERY 6 HOURS
Status: CANCELLED | OUTPATIENT
Start: 2024-12-19 | End: 2024-12-22

## 2024-12-19 RX ORDER — OXYCODONE HYDROCHLORIDE 5 MG/1
5 TABLET ORAL EVERY 4 HOURS PRN
Qty: 42 TABLET | Refills: 0 | Status: SHIPPED | OUTPATIENT
Start: 2024-12-19 | End: 2024-12-26

## 2024-12-19 RX ADMIN — Medication 80 MCG: at 07:52

## 2024-12-19 RX ADMIN — SODIUM CHLORIDE: 9 INJECTION, SOLUTION INTRAVENOUS at 07:00

## 2024-12-19 RX ADMIN — ROPIVACAINE HYDROCHLORIDE 20 ML: 2 INJECTION, SOLUTION EPIDURAL; INFILTRATION at 07:06

## 2024-12-19 RX ADMIN — SODIUM CHLORIDE, POTASSIUM CHLORIDE, SODIUM LACTATE AND CALCIUM CHLORIDE: 600; 310; 30; 20 INJECTION, SOLUTION INTRAVENOUS at 08:12

## 2024-12-19 RX ADMIN — OXYCODONE HYDROCHLORIDE 10 MG: 10 TABLET, FILM COATED, EXTENDED RELEASE ORAL at 06:30

## 2024-12-19 RX ADMIN — DEXMEDETOMIDINE 4 MCG: 100 INJECTION, SOLUTION INTRAVENOUS at 08:44

## 2024-12-19 RX ADMIN — FENTANYL CITRATE 100 MCG: 50 INJECTION, SOLUTION INTRAMUSCULAR; INTRAVENOUS at 07:01

## 2024-12-19 RX ADMIN — EPHEDRINE SULFATE 10 MG: 50 INJECTION, SOLUTION INTRAVENOUS at 08:23

## 2024-12-19 RX ADMIN — Medication 80 MCG: at 07:55

## 2024-12-19 RX ADMIN — MIDAZOLAM HYDROCHLORIDE 2 MG: 1 INJECTION, SOLUTION INTRAMUSCULAR; INTRAVENOUS at 07:01

## 2024-12-19 RX ADMIN — PROPOFOL 30 MG: 10 INJECTION, EMULSION INTRAVENOUS at 08:44

## 2024-12-19 RX ADMIN — Medication 120 MCG: at 08:05

## 2024-12-19 RX ADMIN — OXYCODONE 5 MG: 5 TABLET ORAL at 12:32

## 2024-12-19 RX ADMIN — LIDOCAINE HYDROCHLORIDE 40 MG: 20 INJECTION, SOLUTION EPIDURAL; INFILTRATION; INTRACAUDAL; PERINEURAL at 07:45

## 2024-12-19 RX ADMIN — CEFAZOLIN SODIUM 2000 MG: 1 POWDER, FOR SOLUTION INTRAMUSCULAR; INTRAVENOUS at 07:47

## 2024-12-19 RX ADMIN — PROPOFOL 50 MCG/KG/MIN: 10 INJECTION, EMULSION INTRAVENOUS at 07:46

## 2024-12-19 RX ADMIN — EPHEDRINE SULFATE 10 MG: 50 INJECTION, SOLUTION INTRAVENOUS at 09:12

## 2024-12-19 RX ADMIN — DEXMEDETOMIDINE 8 MCG: 100 INJECTION, SOLUTION INTRAVENOUS at 07:41

## 2024-12-19 RX ADMIN — ONDANSETRON 4 MG: 2 INJECTION, SOLUTION INTRAMUSCULAR; INTRAVENOUS at 09:00

## 2024-12-19 RX ADMIN — Medication 80 MCG: at 08:00

## 2024-12-19 RX ADMIN — TRANEXAMIC ACID 1000 MG: 100 INJECTION, SOLUTION INTRAVENOUS at 07:50

## 2024-12-19 RX ADMIN — EPHEDRINE SULFATE 20 MG: 50 INJECTION, SOLUTION INTRAVENOUS at 08:27

## 2024-12-19 RX ADMIN — PROPOFOL 20 MG: 10 INJECTION, EMULSION INTRAVENOUS at 07:45

## 2024-12-19 RX ADMIN — ACETAMINOPHEN 975 MG: 325 TABLET ORAL at 06:30

## 2024-12-19 RX ADMIN — HYDROMORPHONE HYDROCHLORIDE 0.5 MG: 1 INJECTION, SOLUTION INTRAMUSCULAR; INTRAVENOUS; SUBCUTANEOUS at 11:10

## 2024-12-19 RX ADMIN — PREGABALIN 75 MG: 75 CAPSULE ORAL at 06:30

## 2024-12-19 RX ADMIN — BUPIVACAINE HYDROCHLORIDE 2 ML: 5 INJECTION, SOLUTION EPIDURAL; INTRACAUDAL; PERINEURAL at 07:38

## 2024-12-19 RX ADMIN — Medication 40 MCG: at 08:11

## 2024-12-19 ASSESSMENT — PAIN SCALES - GENERAL
PAINLEVEL_OUTOF10: 0
PAINLEVEL_OUTOF10: 0
PAINLEVEL_OUTOF10: 5
PAINLEVEL_OUTOF10: 0
PAINLEVEL_OUTOF10: 4
PAINLEVEL_OUTOF10: 0

## 2024-12-19 ASSESSMENT — PAIN - FUNCTIONAL ASSESSMENT: PAIN_FUNCTIONAL_ASSESSMENT: NONE - DENIES PAIN

## 2024-12-19 ASSESSMENT — PAIN DESCRIPTION - PAIN TYPE
TYPE: SURGICAL PAIN
TYPE: SURGICAL PAIN

## 2024-12-19 ASSESSMENT — PAIN DESCRIPTION - ORIENTATION
ORIENTATION: RIGHT
ORIENTATION: RIGHT

## 2024-12-19 ASSESSMENT — PAIN DESCRIPTION - LOCATION
LOCATION: KNEE
LOCATION: KNEE

## 2024-12-19 ASSESSMENT — PAIN DESCRIPTION - FREQUENCY
FREQUENCY: CONTINUOUS
FREQUENCY: CONTINUOUS

## 2024-12-19 ASSESSMENT — PAIN DESCRIPTION - DESCRIPTORS
DESCRIPTORS: ACHING
DESCRIPTORS: ACHING

## 2024-12-19 NOTE — PROGRESS NOTES
PHYSICAL THERAPY EVALUATION/DISCHARGE    Patient: Jonh Solomon (54 y.o. female)  Date: 12/19/2024  Primary Diagnosis: Primary osteoarthritis of right knee [M17.11]  Procedure(s) (LRB):  RIGHT TOTAL KNEE ARTHROPLASTY (BENI) (Right) Day of Surgery   Precautions: Weight Bearing Right Lower Extremity Weight Bearing: Weight Bearing As Tolerated                    ASSESSMENT AND RECOMMENDATIONS:  Based on the objective data below, the patient presents with expected level of R knee pain, decreased AROM and strength, gait dysfunction and impaired standing tolerance s/p R TKA, POD 0.  Patient seen in recovery with sensation and motor intact bilaterally.  Instructed in LE supine therex with minimal cueing needed, good carryover. Overall mobilizing well with just SBA needed, minor cues for safe use of RW with transfers and gait but reciprocal gait pattern noted today (trained in sequencing if pain increases for step-to offloading pattern).  Trained in stair climbing with R rail, two hands and lateral approach to simulate entrance into home.  Passed all stair training with SBA and no safety concerns. Reviewed car transfers, activity modification and pacing with patient having no further questions, confident in ability to discharge.  When being wheeled back to ASU bed, patient c/o dizziness, BP stable, dizziness improving, RN alerted. Patient cleared from mobility standpoint with RN to monitor BP/dizziness and decide readiness for d/c.    Further skilled acute physical therapy is not indicated at this time.       PLAN :  Recommendation for discharge: (in order for the patient to meet his/her long term goals):   Per MD    Other factors to consider for discharge: no additional factors    IF patient discharges home will need the following DME:  RW has been delivered for discharge       SUBJECTIVE:   Patient stated “It's a whole new knee.”    OBJECTIVE DATA SUMMARY:     Past Medical History:   Diagnosis Date    Arthritis             []                                        []                                           Short Arc Quads   []                                        []                                        []                                        []                                           Knee Extension Stretch     []                                          []                                          []                                          []                                           Heel Slides 1 10 [x]                                        []                                        []                                        []                                           Long Arc Quads   []                                        []                                        []                                        []                                           Knee Flexion Stretch   []                                        []                                        []                                        []                                           Straight Leg Raises   []                                        []                                        []                                        []                                                                                                                                                                                                                                                                  Pain Ratin-3/10 R knee  Pain Intervention(s):   patient medicated for pain prior to session and pain is at a level acceptable to the patient    Activity Tolerance:   Good    After treatment:   Patient left in no apparent distress sitting up in chair, Call bell within reach, and Caregiver / family present      COMMUNICATION/EDUCATION:   The patient's plan of care was discussed with: registered nurse         Thank you for this referral.  Kelly Grullon, PT            Quality 130: Documentation Of Current Medications In The Medical Record: Current Medications Documented Quality 431: Preventive Care And Screening: Unhealthy Alcohol Use - Screening: Patient not identified as an unhealthy alcohol user when screened for unhealthy alcohol use using a systematic screening method Detail Level: Generalized Quality 226: Preventive Care And Screening: Tobacco Use: Screening And Cessation Intervention: Patient screened for tobacco use and is an ex/non-smoker

## 2024-12-19 NOTE — ANESTHESIA PROCEDURE NOTES
Peripheral Block    Patient location during procedure: pre-op  Reason for block: procedure for pain, post-op pain management, primary anesthetic and at surgeon's request  Start time: 12/19/2024 7:01 AM  End time: 12/19/2024 7:07 AM  Staffing  Performed: anesthesiologist   Anesthesiologist: Bhaskar Green MD  Performed by: Bhaskar Green MD  Authorized by: Bhaskar Green MD    Preanesthetic Checklist  Completed: patient identified, IV checked, site marked, risks and benefits discussed, surgical/procedural consents, pre-op evaluation, timeout performed, anesthesia consent given, oxygen available and monitors applied/VS acknowledged  Peripheral Block   Patient position: supine  Prep: ChloraPrep  Provider prep: mask and sterile gloves  Patient monitoring: cardiac monitor, continuous pulse ox, continuous capnometry, frequent blood pressure checks, IV access, oxygen and responsive to questions  Block type: iPacks  Laterality: right  Injection technique: single-shot  Guidance: ultrasound guided    Needle   Needle type: Other   Needle gauge: 21 G  Needle localization: ultrasound guidance  Needle length: 10 cmOther needle type: STIMUPLEX  Assessment   Injection assessment: negative aspiration for heme, no paresthesia on injection, local visualized surrounding nerve on ultrasound and no intravascular symptoms  Hemodynamics: stable  Outcomes: patient tolerated procedure well    Additional Notes  Giron RN witnessed timeout and block written on correct side.

## 2024-12-19 NOTE — ANESTHESIA POSTPROCEDURE EVALUATION
Department of Anesthesiology  Postprocedure Note    Patient: Jonh Solomon  MRN: [id number]  YOB: 1970  Date of evaluation: 12/19/2024    Procedure Summary       Date: 12/19/24 Room / Location: [facility] ASU OR  / [facility] AMBULATORY OR    Anesthesia Start: 0741 Anesthesia Stop: 0953    Procedure: RIGHT TOTAL KNEE ARTHROPLASTY (BENI) (Right: Knee) Diagnosis:       Primary osteoarthritis of right knee      (Primary osteoarthritis of right knee [M17.11])    Surgeons: Lucio Corado MD Responsible Provider: Bhaskar Green MD    Anesthesia Type: Spinal ASA Status: 2            Anesthesia Type: Spinal    Randy Phase I: Randy Score: 9    Randy Phase II:      Anesthesia Post Evaluation    Patient location during evaluation: PACU  Patient participation: complete - patient participated  Level of consciousness: awake  Pain score: 0  Airway patency: patent  Nausea & Vomiting: no nausea and no vomiting  Cardiovascular status: blood pressure returned to baseline  Respiratory status: acceptable  Hydration status: euvolemic  Pain management: adequate    No notable events documented.

## 2024-12-19 NOTE — DISCHARGE INSTRUCTIONS
TOTAL KNEE DISCHARGE INSTRUCTIONS      Patient: Jonh Solomon MRN: [id number]  SSN: [id number]              Please take the time to review the following instructions before you leave the hospital and use them as guidelines during your recovery from surgery.  If you have any questions you may contact my office at [phone]  After business hours or during the weekend you can contact me through [employer] [Preferred] or text / call at [phone] (cell phone) for emergency's. Please use the office number during regular business hours.    SPECIAL INSTRUCTIONS :   1. Full extension at the knee is the most important aspect of your range of motion. Avoid placing a pillow or bump behind the knee. Rather, place the heel up on a bump or pillow and allow gravity to help straighten the knee.   2. You may weight bear as tolerated on the knee and during the day you should bend the knee as much as possible.   3. Drainage from the incision more than 4 days from surgery is concerning. Contact my office if there is any question [phone].   4. You may contact me directly through [employer] if there are specific questions or text / call using my cell number [phone].      DRESSING :     Post-op Dressings : This should be removed by physical therapy or you may remove this yourself 7 days after the date of your surgery. If there is no drainage, then a simple dressing may be used or no dressing at all. Other dressing options can be purchased over the counter at a local pharmacy or medical supply vendor.        A porous adhesive dressing such as pictured above can be purchased online ([employer]) or at your local [employer] or [employer]. You only need to keep the incision covered for 7 days after showers. A dressing may be used for longer if there are issues with clothing clinging to the incision.         Showering/ Bathing:    You may shower with the Post-op dressing in place. This is left in place for 7  foot, especially after surgeries on the legs.    It is important to do ankle pump exercises regularly after surgery because immobility increases your risk for developing a blood clot.  Your doctor may also have you take an Aspirin for the next few days as well.      If your doctor did not ask you to take an Aspirin, consult with him before starting Aspirin therapy on your own.    The exercise is quite simple.     Slowly point your foot forward, feeling the muscles on the top of your lower leg stretch, and hold this position for 5 seconds.                  Next, pull your foot back toward you as far as possible, stretching the calf muscles, and hold that position for 5 seconds.                 Repeat with the other foot.  Perform 10 repetitions every hour while awake for both ankles if possible (down and then up with the foot once is one repetition).    You should feel gentle stretching of the muscles in your lower leg when doing this exercise.  If you feel pain, or your range of motion is limited, don't push too hard.  Only go the limit your joint and muscles will let you go.  If you have increasing pain, progressively worsening leg warmth or swelling, STOP the exercise and call your doctor.           MEDICATION AND SIDE EFFECT GUIDE    [facility] MEDICATION AND SIDE EFFECT GUIDE was provided to the PATIENT AND CARE PROVIDER.  Information provided includes instruction about drug purpose and common side effects for the following medications:   Acetaminophen  Aspirin  Ibuprofen  Ondansetron  Oxycodone  Pregabalin  Tramadol        These are general instructions for a healthy lifestyle:    *   Please give a list of your current medications to your Primary Care Provider.  *   Please update this list whenever your medications are discontinued, doses are changed, or new medications (including over-the-counter products) are added.  *   Please carry medication information at all times in case of emergency

## 2024-12-19 NOTE — ANESTHESIA POSTPROCEDURE EVALUATION
Department of Anesthesiology  Postprocedure Note    Patient: Jonh Solomon  MRN: [id number]  YOB: 1970  Date of evaluation: 12/19/2024    Procedure Summary       Date: 12/19/24 Room / Location: [facility] ASU OR  / [facility] AMBULATORY OR    Anesthesia Start: 0741 Anesthesia Stop: 0953    Procedure: RIGHT TOTAL KNEE ARTHROPLASTY (BENI) (Right: Knee) Diagnosis:       Primary osteoarthritis of right knee      (Primary osteoarthritis of right knee [M17.11])    Surgeons: Lucio Corado MD Responsible Provider: Bhaskar Green MD    Anesthesia Type: Spinal ASA Status: 2            Anesthesia Type: Spinal    Randy Phase I: Randy Score: 9    Randy Phase II:      Anesthesia Post Evaluation    Patient location during evaluation: PACU  Patient participation: complete - patient participated  Level of consciousness: awake  Pain score: 0  Airway patency: patent  Nausea & Vomiting: no nausea and no vomiting  Cardiovascular status: blood pressure returned to baseline  Respiratory status: acceptable  Hydration status: euvolemic  Pain management: adequate    No notable events documented.   No

## 2024-12-24 NOTE — OP NOTE
OPERATIVE REPORT     Preoperative Diagnosis: Primary osteoarthritis of right knee [M17.11]  Postoperative Diagnosis: Same    Procedure: Procedure(s):  RIGHT TOTAL KNEE ARTHROPLASTY (BENI)  Surgeon: Lucio Corado MD  Assistant(s): Jorge Reeves PA-C  Anesthesia: Spinal   Estimated Blood Loss: 300cc  Specimens: None  Complications: None       INDICATIONS:   The patient is a 54 y.o., female who has complained of a long history of knee pain. The patient  has failed conservative treatment and presents for definitive operative care. Informed consent obtained including a discussion of the risks and benefits, which include, but are not limited to, bleeding, infection, neurovascular damage, wound complications, pain and stiffness in the knee, periprosthetic loosening, fracture dislocation and DVT, the patient consented for the procedure.     DESCRIPTION OF PROCEDURE:        The patient was seen in the preoperative holding area. The patient was positively identified. The limb was initialed,  questions were answered. The patient was given Ancef preop for an antibiotic. The patient was subsequently taken to the operating room. The patient underwent spinal anesthesia. The patient was positioned in the supine position. All bony prominences were well padded. The limb was prepped and draped in a sterile fashion. The appropriate pause for safety was performed.          A mid-line incision was created. Utilizing an incision from above the superior pole of the patella distally to the tibial tubercle. The incision was taken down through the skin and subcutaneous tissue until the retinaculum could be identified. This was sharply incised utilizing a medial parapatellar incision. The femoral array was placed at the superior portion of the incision. The patella was everted, a planar resurfacing was performed and the drill guide was placed with the appropriate rotation. The medial-based soft tissue was then retracted as well as well as

## 2025-06-20 ENCOUNTER — TRANSCRIBE ORDERS (OUTPATIENT)
Facility: HOSPITAL | Age: 55
End: 2025-06-20

## 2025-06-20 DIAGNOSIS — M25.561 CHRONIC PAIN OF RIGHT KNEE: Primary | ICD-10-CM

## 2025-06-20 DIAGNOSIS — Z96.651 S/P TOTAL KNEE REPLACEMENT, RIGHT: ICD-10-CM

## 2025-06-20 DIAGNOSIS — G89.29 CHRONIC PAIN OF RIGHT KNEE: Primary | ICD-10-CM

## 2025-07-03 ENCOUNTER — HOSPITAL ENCOUNTER (OUTPATIENT)
Facility: HOSPITAL | Age: 55
Discharge: HOME | End: 2025-07-03
Payer: MEDICAID

## 2025-07-03 DIAGNOSIS — M25.561 CHRONIC PAIN OF RIGHT KNEE: ICD-10-CM

## 2025-07-03 DIAGNOSIS — G89.29 CHRONIC PAIN OF RIGHT KNEE: ICD-10-CM

## 2025-07-03 DIAGNOSIS — Z96.651 S/P TOTAL KNEE REPLACEMENT, RIGHT: ICD-10-CM

## 2025-07-03 PROCEDURE — 73700 CT LOWER EXTREMITY W/O DYE: CPT

## 2025-08-25 ENCOUNTER — HOSPITAL ENCOUNTER (OUTPATIENT)
Facility: HOSPITAL | Age: 55
Discharge: HOME | End: 2025-08-28
Payer: MEDICAID

## 2025-08-25 DIAGNOSIS — M17.11 ARTHRITIS OF RIGHT KNEE: ICD-10-CM

## 2025-08-25 PROCEDURE — 73700 CT LOWER EXTREMITY W/O DYE: CPT

## (undated) DEVICE — GLOVE SURG SZ 7 L12IN FNGR THK79MIL GRN LTX FREE

## (undated) DEVICE — SUTURE VCRL SZ 0 L27IN ABSRB UD SH L26MM 1/2 CIR TAPERPOINT J418H

## (undated) DEVICE — CATH 5F 100CM JR40 -- DXTERITY

## (undated) DEVICE — SUTURE PROL SZ 0 L30IN NONABSORBABLE BLU L36MM CT-1 1/2 CIR 8424H

## (undated) DEVICE — 3M™ STERI-DRAPE™ SMALL DRAPE WITH ADHESIVE APERTURE 1092 25/BX,4/CS&#X20;: Brand: STERI-DRAPE™

## (undated) DEVICE — CATH 5F 100CM JL35 -- DXTERITY

## (undated) DEVICE — TOTAL JOINT-SFMC: Brand: MEDLINE INDUSTRIES, INC.

## (undated) DEVICE — DRAPE,CHEST,FENES,15X10,STERIL: Brand: MEDLINE

## (undated) DEVICE — GUIDEWIRE VASC L260CM DIA0.035IN TIP L3MM STD EXCHG PTFE J

## (undated) DEVICE — BLANKET WRM W35.4XL86.6IN FULL UNDERBODY + FORC AIR

## (undated) DEVICE — SYRINGE MED 30ML STD CLR PLAS LUERLOCK TIP N CTRL DISP

## (undated) DEVICE — SUTURE VICRYL SZ 2-0 L36IN ABSRB UD L36MM CT-1 1/2 CIR J945H

## (undated) DEVICE — SUTURE ETHLN SZ 3-0 L18IN NONABSORBABLE BLK PS-2 L19MM 3/8 1669H

## (undated) DEVICE — TAPE,CLOTH/SILK,CURAD,3"X10YD,LF,40/CS: Brand: CURAD

## (undated) DEVICE — RUBBERBAND FASTENING W0.25XL3.5IN 5 PER PK

## (undated) DEVICE — KIT TRK KNEE PROC VIZADISC

## (undated) DEVICE — SOLUTION WND IRRIGATION 450 ML 0.5 PVP-I 0.9 NACL

## (undated) DEVICE — SUTURE STRATAFIX SYMMETRIC SZ 1 L18IN ABSRB VLT CT1 L36CM SXPP1A404

## (undated) DEVICE — SUTURE MCRYL SZ 5-0 L18IN ABSRB UD L19MM PS-2 3/8 CIR PRIM Y495G

## (undated) DEVICE — COVER,MAYO STAND,STERILE: Brand: MEDLINE

## (undated) DEVICE — 1010 S-DRAPE TOWEL DRAPE 10/BX: Brand: STERI-DRAPE™

## (undated) DEVICE — SURGICAL PROCEDURE TRAY CRD CATH 3 PRT

## (undated) DEVICE — SOLUTION IV SODIUM CHL 0.9% 100 ML INJ FLX CONTAINER

## (undated) DEVICE — SYRINGE MED 10ML RED POLYCARB BRL FIX M LUER CONN FLAT GRP

## (undated) DEVICE — SPONGE GZ W4XL4IN COT 12 PLY TYP VII WVN C FLD DSGN STERILE

## (undated) DEVICE — CANISTER, RIGID, 3000CC: Brand: MEDLINE INDUSTRIES, INC.

## (undated) DEVICE — SUTURE MCRYL SZ 2-0 L27IN ABSRB UD SH L26MM TAPERPOINT NDL Y417H

## (undated) DEVICE — SOLUTION IRRIG 1000ML STRL H2O USP PLAS POUR BTL

## (undated) DEVICE — REM POLYHESIVE ADULT PATIENT RETURN ELECTRODE: Brand: VALLEYLAB

## (undated) DEVICE — 4-PORT MANIFOLD: Brand: NEPTUNE 2

## (undated) DEVICE — PLASMABLADE PS210-030S 3.0S LOCK: Brand: PLASMABLADE™

## (undated) DEVICE — SUTURE MONOCRYL STRATAFIX SPRL + 3 0 SGL ARMED PS 1 24 IN LEN SXMP1B103

## (undated) DEVICE — STRYKER PERFORMANCE SERIES SAGITTAL BLADE: Brand: STRYKER PERFORMANCE SERIES

## (undated) DEVICE — STAPLER SKIN H3.9MM WIRE DIA0.58MM CRWN 6.9MM 35 STPL ROT

## (undated) DEVICE — SOLUTION IRRIG 3000ML 0.9% SOD CHL USP UROMATIC PLAS CONT

## (undated) DEVICE — 3M™ TEGADERM™ TRANSPARENT FILM DRESSING FRAME STYLE, 1626W, 4 IN X 4-3/4 IN (10 CM X 12 CM), 50/CT 4CT/CASE: Brand: 3M™ TEGADERM™

## (undated) DEVICE — DRAPE THERMABASIN INTRATEMP --

## (undated) DEVICE — GLOVE SURG SZ 9 L12IN FNGR THK79MIL GRN LTX FREE

## (undated) DEVICE — ELECTRODE BLDE L4IN NONINSULATED EDGE

## (undated) DEVICE — PENCIL SMK EVAC ALL IN 1 DSGN ENH VISIBILITY IMPROVED AIR

## (undated) DEVICE — GLOVE SURG SZ 65 CRM LTX FREE POLYISOPRENE POLYMER BEAD ANTI

## (undated) DEVICE — GLOVE ORANGE PI 7 1/2   MSG9075

## (undated) DEVICE — TUBING SUCT L9FT FOR AUTOFUSE INFLTR SYS

## (undated) DEVICE — SOLUTION IRRIG 1000ML 0.9% SOD CHL USP POUR PLAS BTL

## (undated) DEVICE — DRESSING ANTIMIC FOAM OPTIFOAM POSTOP ADH 4X14 IN

## (undated) DEVICE — RESERVOIR,SUCTION,100CC,SILICONE: Brand: MEDLINE

## (undated) DEVICE — SUTURE VICRYL + SZ 1-0 L36IN ABSRB UD CTX 1/2 CIR TAPR PNT VCP977H

## (undated) DEVICE — WASTEBAG DRIP/ADAPTER: Brand: MEDLINE INDUSTRIES, INC.

## (undated) DEVICE — PLASTICS -SFMC: Brand: MEDLINE INDUSTRIES, INC.

## (undated) DEVICE — GLOVE SURG SZ 85 L12IN FNGR ORTHO 126MIL CRM LTX FREE

## (undated) DEVICE — KIT DRP FOR RIO ROBOTIC ARM ASST SYS

## (undated) DEVICE — AEGIS 1" DISK 4MM HOLE, PEEL OPEN: Brand: MEDLINE

## (undated) DEVICE — 48" PROBE COVER W/GEL, ULTRASOUND, STERILE: Brand: SITE-RITE

## (undated) DEVICE — SUTURE MCRYL SZ 3-0 L27IN ABSRB UD L19MM PS-2 3/8 CIR PRIM Y427H

## (undated) DEVICE — PIN BONE 3.2 X 140MM

## (undated) DEVICE — HOOD: Brand: FLYTE

## (undated) DEVICE — SYRINGE MED 10ML PUR GAM COMPATIBLE POLYCARB FIX M LUER CONN

## (undated) DEVICE — POST-SURG COMPRESSION BRA,XXL: Brand: MEDLINE

## (undated) DEVICE — GLOVE SURG SZ 85 L12IN FNGR THK79MIL GRN LTX FREE

## (undated) DEVICE — Device: Brand: JELCO

## (undated) DEVICE — BRA INCONT NURSING XL 44-47 IN

## (undated) DEVICE — DRAPE,EXTREMITY,89X128,STERILE: Brand: MEDLINE

## (undated) DEVICE — PREMIUM WET SKIN PREP TRAY: Brand: MEDLINE INDUSTRIES, INC.

## (undated) DEVICE — BLADE SURG SAW STD S STL OSC W/ SERR EDGE DISP